# Patient Record
Sex: FEMALE | Race: WHITE | NOT HISPANIC OR LATINO | Employment: OTHER | URBAN - METROPOLITAN AREA
[De-identification: names, ages, dates, MRNs, and addresses within clinical notes are randomized per-mention and may not be internally consistent; named-entity substitution may affect disease eponyms.]

---

## 2023-03-30 ENCOUNTER — OFFICE VISIT (OUTPATIENT)
Dept: INTERNAL MEDICINE | Facility: CLINIC | Age: 78
End: 2023-03-30
Payer: MEDICARE

## 2023-03-30 ENCOUNTER — TELEPHONE (OUTPATIENT)
Dept: INTERNAL MEDICINE | Facility: CLINIC | Age: 78
End: 2023-03-30
Payer: MEDICARE

## 2023-03-30 VITALS
BODY MASS INDEX: 23.85 KG/M2 | TEMPERATURE: 98.3 F | HEIGHT: 69 IN | DIASTOLIC BLOOD PRESSURE: 78 MMHG | OXYGEN SATURATION: 98 % | SYSTOLIC BLOOD PRESSURE: 116 MMHG | HEART RATE: 70 BPM | WEIGHT: 161 LBS

## 2023-03-30 DIAGNOSIS — E78.5 HYPERLIPIDEMIA, UNSPECIFIED HYPERLIPIDEMIA TYPE: ICD-10-CM

## 2023-03-30 DIAGNOSIS — M25.569 KNEE PAIN, UNSPECIFIED CHRONICITY, UNSPECIFIED LATERALITY: ICD-10-CM

## 2023-03-30 DIAGNOSIS — Z12.31 ENCOUNTER FOR SCREENING MAMMOGRAM FOR MALIGNANT NEOPLASM OF BREAST: ICD-10-CM

## 2023-03-30 DIAGNOSIS — G25.81 RESTLESS LEG SYNDROME: ICD-10-CM

## 2023-03-30 DIAGNOSIS — R73.03 PREDIABETES: ICD-10-CM

## 2023-03-30 DIAGNOSIS — Z78.0 POST-MENOPAUSE: ICD-10-CM

## 2023-03-30 DIAGNOSIS — Z11.59 ENCOUNTER FOR HEPATITIS C SCREENING TEST FOR LOW RISK PATIENT: ICD-10-CM

## 2023-03-30 DIAGNOSIS — G47.33 OBSTRUCTIVE SLEEP APNEA SYNDROME: ICD-10-CM

## 2023-03-30 DIAGNOSIS — Z79.899 HIGH RISK MEDICATION USE: ICD-10-CM

## 2023-03-30 DIAGNOSIS — E03.9 HYPOTHYROIDISM, UNSPECIFIED TYPE: ICD-10-CM

## 2023-03-30 DIAGNOSIS — I10 PRIMARY HYPERTENSION: Primary | ICD-10-CM

## 2023-03-30 PROBLEM — E07.9 THYROID DISORDER: Status: ACTIVE | Noted: 2023-03-30

## 2023-03-30 PROBLEM — K21.9 GERD (GASTROESOPHAGEAL REFLUX DISEASE): Status: ACTIVE | Noted: 2023-03-30

## 2023-03-30 PROBLEM — M17.11 RIGHT KNEE DJD: Status: ACTIVE | Noted: 2023-03-30

## 2023-03-30 RX ORDER — LOSARTAN POTASSIUM 100 MG/1
100 TABLET ORAL DAILY
Qty: 30 TABLET | Refills: 11 | COMMUNITY
Start: 2022-04-26 | End: 2023-04-26

## 2023-03-30 RX ORDER — UREA 10 %
LOTION (ML) TOPICAL
COMMUNITY

## 2023-03-30 RX ORDER — GLUCOSAMINE HCL 500 MG
TABLET ORAL
COMMUNITY

## 2023-03-30 RX ORDER — SERTRALINE HYDROCHLORIDE 100 MG/1
100 TABLET, FILM COATED ORAL DAILY
COMMUNITY
Start: 2023-01-03

## 2023-03-30 RX ORDER — LEVOTHYROXINE SODIUM 112 UG/1
112 TABLET ORAL DAILY
Qty: 30 TABLET | Refills: 11 | COMMUNITY
Start: 2023-01-03 | End: 2024-01-03

## 2023-03-30 RX ORDER — HYDROCHLOROTHIAZIDE 12.5 MG/1
TABLET ORAL
COMMUNITY
Start: 2023-03-06 | End: 2023-03-30 | Stop reason: SDUPTHER

## 2023-03-30 RX ORDER — HYDROCHLOROTHIAZIDE 12.5 MG/1
12.5 TABLET ORAL DAILY
Qty: 90 TABLET | Refills: 2 | Status: SHIPPED | OUTPATIENT
Start: 2023-03-30 | End: 2023-04-03 | Stop reason: SDUPTHER

## 2023-03-30 RX ORDER — ATORVASTATIN CALCIUM 20 MG/1
1 TABLET, FILM COATED ORAL
COMMUNITY
Start: 2023-01-03 | End: 2023-03-30

## 2023-03-30 RX ORDER — ROPINIROLE 0.25 MG/1
0.25 TABLET, FILM COATED ORAL NIGHTLY
Qty: 90 TABLET | Refills: 2 | Status: SHIPPED | OUTPATIENT
Start: 2023-03-30

## 2023-03-30 RX ORDER — ROSUVASTATIN CALCIUM 20 MG/1
20 TABLET, COATED ORAL DAILY
Qty: 90 TABLET | Refills: 2 | Status: SHIPPED | OUTPATIENT
Start: 2023-03-30

## 2023-03-30 NOTE — PROGRESS NOTES
Stefan Valencia M.D.  Internal Medicine  [facility]  [address], [address]  [place] [address]  [phone]      Chief Complaint  Establish Care    SUBJECTIVE    History of Present Illness    Sheela Soto is a 78 y.o. female who presents to the office today as a new patient to establish care.     Today she reports RLS which keeps her up at night. She is interested in Pramipexole since her sister is on it.     A month ago she had swelling in her right leg and was started on HCTZ for edema. HCTZ did help this issue. She is wearing compression stockings.     Hypertension-on HCTZ and losartan.     Hypothyroidism-on levothyroxine    Depression-Zoloft.     Hyperlipidemia-intolerant to statins    Takes OTC omeprazole for GERD    OMAYRA-follows with Dr. Gonzales at [facility].    Social-She has one daughter. Her son was killed in a MVA. Walks a half mile/day.     Review of Systems    Allergies   Allergen Reactions   • Codeine Nausea Only   • Latex Rash     Doesn't like to wear latex gloves          Outpatient Medications Marked as Taking for the 3/30/23 encounter (Office Visit) with Stefan Valencia MD   Medication Sig Dispense Refill   • Carboxymeth-Glycerin-Polysorb 0.5-1-0.5 % solution Apply  to eye(s) as directed by provider.     • Cholecalciferol (Vitamin D3) 75 MCG (3000 UT) tablet Take  by mouth.     • estrogens, conjugated, (PREMARIN) 0.625 MG tablet Take 1 tablet by mouth Daily.     • hydroCHLOROthiazide (HYDRODIURIL) 12.5 MG tablet Take 1 tablet by mouth Daily. 90 tablet 2   • levothyroxine (SYNTHROID, LEVOTHROID) 112 MCG tablet Take 1 tablet by mouth Daily. 30 tablet 11   • losartan (COZAAR) 100 MG tablet Take 1 tablet by mouth Daily. 30 tablet 11   • Omeprazole 20 MG Tablet Delayed Release Dispersible   Oral, Daily, 0 Refill(s)     • sertraline (ZOLOFT) 100 MG tablet Take 1 tablet by mouth Daily.     • Zinc Sulfate 220 (50 Zn) MG tablet Take  by mouth.     • [DISCONTINUED] atorvastatin  "(LIPITOR) 20 MG tablet Take 1 tablet by mouth every night at bedtime.     • [DISCONTINUED] hydroCHLOROthiazide (HYDRODIURIL) 12.5 MG tablet           Past Medical History:   Diagnosis Date   • Hyperlipidemia    • Hypertension    • Hypothyroidism    • Sleep apnea      Past Surgical History:   Procedure Laterality Date   • HYSTERECTOMY     • REPLACEMENT TOTAL KNEE BILATERAL       Family History   Problem Relation Age of Onset   • Pneumonia Mother    • No Known Problems Brother    • Autism Other     reports that she has never smoked. She has never used smokeless tobacco. She reports current alcohol use of about 3.0 standard drinks per week.    OBJECTIVE    Vital Signs:   /78   Pulse 70   Temp 98.3 °F (36.8 °C)   Ht 174 cm (68.5\")   Wt 73 kg (161 lb)   SpO2 98%   BMI 24.12 kg/m²     Physical Exam  Constitutional:       Appearance: Normal appearance. She is normal weight.   Cardiovascular:      Rate and Rhythm: Normal rate and regular rhythm.      Heart sounds: Normal heart sounds. No murmur heard.  Pulmonary:      Effort: Pulmonary effort is normal.      Breath sounds: Normal breath sounds.   Abdominal:      General: Abdomen is flat. There is no distension.      Palpations: Abdomen is soft.      Tenderness: There is no abdominal tenderness.   Skin:     General: Skin is warm and dry.   Neurological:      Mental Status: She is alert.   Psychiatric:         Mood and Affect: Mood normal.         Behavior: Behavior normal.         Thought Content: Thought content normal.            The following data was reviewed by: Stefan Valencia MD on 03/30/2023:  Common labs    Common Labs 5/4/22 5/4/22 5/4/22 8/4/22 3/30/23 3/30/23 3/30/23 3/30/23    1439 1439 1439  1451 1451 1451 1451   Glucose       95    Glucose   87        BUN   14    13    Creatinine   0.58    0.66    Sodium   139    136    Potassium   4.1    4.5    Chloride   102    98    Calcium   9.3    9.8    Total Protein       6.7    Albumin   4.3    4.3    Total " Bilirubin   0.5    0.5    Alkaline Phosphatase   78    84    AST (SGOT)   30    18    ALT (SGPT)   15    11    WBC 5.23     5.6     Hemoglobin 12.4     13.3     Hematocrit 38.1     39.0     Platelets 264     308     Total Cholesterol     271 (A)      Triglycerides     125      HDL Cholesterol     93      LDL Cholesterol      157 (A)      Hemoglobin A1C  6.2 (A)  6.2 (A)    6.2 (A)   (A) Abnormal value       Comments are available for some flowsheets but are not being displayed.           Data reviewed: Pulmonology note.             ASSESSMENT & PLAN     Diagnoses and all orders for this visit:    1. Primary hypertension (Primary)  Assessment & Plan:  Hypertension is unchanged.  Continue current treatment regimen.  Blood pressure will be reassessed at the next regular appointment.    Orders:  -     hydroCHLOROthiazide (HYDRODIURIL) 12.5 MG tablet; Take 1 tablet by mouth Daily.  Dispense: 90 tablet; Refill: 2    2. Post-menopause  -     DEXA Bone Density Axial    3. Encounter for screening mammogram for malignant neoplasm of breast  -     Mammo Screening Digital Tomosynthesis Bilateral With CAD; Future    4. Encounter for hepatitis C screening test for low risk patient  -     HCV Antibody Rfx To Qnt PCR    5. Prediabetes  -     Hemoglobin A1c    6. High risk medication use  -     Comprehensive Metabolic Panel    7. Obstructive sleep apnea syndrome  -     Ambulatory Referral to Sleep Medicine    8. Hyperlipidemia, unspecified hyperlipidemia type  -     Lipid Panel With LDL / HDL Ratio  -     rosuvastatin (Crestor) 20 MG tablet; Take 1 tablet by mouth Daily.  Dispense: 90 tablet; Refill: 2    9. Restless leg syndrome  Assessment & Plan:  Discussed etiology of restless leg syndrome and that my iron may help if she is deficient.  We will try pramipexole in the meantime since this is interfering significantly with her life.    Orders:  -     CBC & Differential  -     Iron and TIBC  -     Ferritin  -     rOPINIRole (REQUIP)  0.25 MG tablet; Take 1 tablet by mouth Every Night. Take 1 hour before bedtime.  Dispense: 90 tablet; Refill: 2    10. Hypothyroidism, unspecified type  -     TSH Rfx On Abnormal To Free T4    11. Knee pain, unspecified chronicity, unspecified laterality  -     Iron and TIBC  -     Ferritin    Other orders  -     Interpretation:    I spent 66  minutes caring for Sheela on this date of service. This time includes time spent by me in the following activities:preparing for the visit, reviewing tests, performing a medically appropriate examination and/or evaluation , counseling and educating the patient/family/caregiver, ordering medications, tests, or procedures and documenting information in the medical record      Health Maintenance Due   Topic Date Due   • DXA SCAN  Never done   • TDAP/TD VACCINES (1 - Tdap) Never done   • ZOSTER VACCINE (1 of 2) Never done   • COVID-19 Vaccine (5 - Booster for Pfizer series) 09/29/2022        Follow Up  Return in about 6 months (around 9/30/2023).    Patient/family had no further questions at this time and verbalized understanding of the plan discussed today.

## 2023-03-30 NOTE — TELEPHONE ENCOUNTER
Hub staff attempted to follow warm transfer process and was unsuccessful     Caller: Sheela Soto A    Relationship to patient: Self    Best call back number: [phone]    Patient is needing: PATIENT WANTED TO LET THE OFFICE KNOW SHE UPDATED HER PCP WITH HER INSURANCE. SHE HAS A REFERENCE NUMBER IF THE OFFICE STAFF NEEDS THAT.

## 2023-03-31 PROBLEM — G25.81 RESTLESS LEG SYNDROME: Status: ACTIVE | Noted: 2023-03-31

## 2023-03-31 LAB
ALBUMIN SERPL-MCNC: 4.3 G/DL (ref 3.7–4.7)
ALBUMIN/GLOB SERPL: 1.8 {RATIO} (ref 1.2–2.2)
ALP SERPL-CCNC: 84 IU/L (ref 44–121)
ALT SERPL-CCNC: 11 IU/L (ref 0–32)
AST SERPL-CCNC: 18 IU/L (ref 0–40)
BASOPHILS # BLD AUTO: 0 X10E3/UL (ref 0–0.2)
BASOPHILS NFR BLD AUTO: 1 %
BILIRUB SERPL-MCNC: 0.5 MG/DL (ref 0–1.2)
BUN SERPL-MCNC: 13 MG/DL (ref 8–27)
BUN/CREAT SERPL: 20 (ref 12–28)
CALCIUM SERPL-MCNC: 9.8 MG/DL (ref 8.7–10.3)
CHLORIDE SERPL-SCNC: 98 MMOL/L (ref 96–106)
CHOLEST SERPL-MCNC: 271 MG/DL (ref 100–199)
CO2 SERPL-SCNC: 23 MMOL/L (ref 20–29)
CREAT SERPL-MCNC: 0.66 MG/DL (ref 0.57–1)
EGFRCR SERPLBLD CKD-EPI 2021: 89 ML/MIN/1.73
EOSINOPHIL # BLD AUTO: 0.1 X10E3/UL (ref 0–0.4)
EOSINOPHIL NFR BLD AUTO: 2 %
ERYTHROCYTE [DISTWIDTH] IN BLOOD BY AUTOMATED COUNT: 13.5 % (ref 11.7–15.4)
FERRITIN SERPL-MCNC: 18 NG/ML (ref 15–150)
GLOBULIN SER CALC-MCNC: 2.4 G/DL (ref 1.5–4.5)
GLUCOSE SERPL-MCNC: 95 MG/DL (ref 70–99)
HBA1C MFR BLD: 6.2 % (ref 4.8–5.6)
HCT VFR BLD AUTO: 39 % (ref 34–46.6)
HCV AB SERPL QL IA: NORMAL
HCV IGG SERPL QL IA: NON REACTIVE
HDLC SERPL-MCNC: 93 MG/DL
HGB BLD-MCNC: 13.3 G/DL (ref 11.1–15.9)
IMM GRANULOCYTES # BLD AUTO: 0 X10E3/UL (ref 0–0.1)
IMM GRANULOCYTES NFR BLD AUTO: 0 %
IRON SATN MFR SERPL: 19 % (ref 15–55)
IRON SERPL-MCNC: 89 UG/DL (ref 27–139)
LDLC SERPL CALC-MCNC: 157 MG/DL (ref 0–99)
LDLC/HDLC SERPL: 1.7 RATIO (ref 0–3.2)
LYMPHOCYTES # BLD AUTO: 2.1 X10E3/UL (ref 0.7–3.1)
LYMPHOCYTES NFR BLD AUTO: 37 %
MCH RBC QN AUTO: 29.4 PG (ref 26.6–33)
MCHC RBC AUTO-ENTMCNC: 34.1 G/DL (ref 31.5–35.7)
MCV RBC AUTO: 86 FL (ref 79–97)
MONOCYTES # BLD AUTO: 0.4 X10E3/UL (ref 0.1–0.9)
MONOCYTES NFR BLD AUTO: 6 %
NEUTROPHILS # BLD AUTO: 3.1 X10E3/UL (ref 1.4–7)
NEUTROPHILS NFR BLD AUTO: 54 %
PLATELET # BLD AUTO: 308 X10E3/UL (ref 150–450)
POTASSIUM SERPL-SCNC: 4.5 MMOL/L (ref 3.5–5.2)
PROT SERPL-MCNC: 6.7 G/DL (ref 6–8.5)
RBC # BLD AUTO: 4.53 X10E6/UL (ref 3.77–5.28)
SODIUM SERPL-SCNC: 136 MMOL/L (ref 134–144)
TIBC SERPL-MCNC: 467 UG/DL (ref 250–450)
TRIGL SERPL-MCNC: 125 MG/DL (ref 0–149)
TSH SERPL DL<=0.005 MIU/L-ACNC: 1.24 UIU/ML (ref 0.45–4.5)
UIBC SERPL-MCNC: 378 UG/DL (ref 118–369)
VLDLC SERPL CALC-MCNC: 21 MG/DL (ref 5–40)
WBC # BLD AUTO: 5.6 X10E3/UL (ref 3.4–10.8)

## 2023-03-31 RX ORDER — DOXYCYCLINE HYCLATE 50 MG/1
324 CAPSULE, GELATIN COATED ORAL EVERY OTHER DAY
Qty: 45 TABLET | Refills: 2 | Status: SHIPPED | OUTPATIENT
Start: 2023-03-31

## 2023-03-31 NOTE — ASSESSMENT & PLAN NOTE
Discussed etiology of restless leg syndrome and that my iron may help if she is deficient.  We will try pramipexole in the meantime since this is interfering significantly with her life.

## 2023-04-03 DIAGNOSIS — I10 PRIMARY HYPERTENSION: ICD-10-CM

## 2023-04-03 RX ORDER — HYDROCHLOROTHIAZIDE 12.5 MG/1
12.5 TABLET ORAL DAILY
Qty: 90 TABLET | Refills: 2 | Status: SHIPPED | OUTPATIENT
Start: 2023-04-03

## 2023-04-03 NOTE — TELEPHONE ENCOUNTER
Caller: Sheela Soto    Relationship: Self    Best call back number: [phone]    Requested Prescriptions:   Requested Prescriptions     Pending Prescriptions Disp Refills   • hydroCHLOROthiazide (HYDRODIURIL) 12.5 MG tablet 90 tablet 2     Sig: Take 1 tablet by mouth Daily.        Pharmacy where request should be sent: [facility] [id number] [facility] [address] [facility] [phone] [facility] [phone] FX     Last office visit with prescribing clinician: 3/30/2023   Last telemedicine visit with prescribing clinician: Visit date not found   Next office visit with prescribing clinician: 10/4/2023     Does the patient have less than a 3 day supply:  [x] Yes  [] No    Would you like a call back once the refill request has been completed: [x] Yes [] No    If the office needs to give you a call back, can they leave a voicemail: [x] Yes [] No    Aarti Ordaz   04/03/23 11:12 EDT

## 2023-05-10 ENCOUNTER — TELEPHONE (OUTPATIENT)
Dept: INTERNAL MEDICINE | Facility: CLINIC | Age: 78
End: 2023-05-10

## 2023-05-10 NOTE — TELEPHONE ENCOUNTER
Caller: Sheela Soto    Relationship to patient: Self    Best call back number: [phone]    New or established patient?  [] New  [x] Established    Date of discharge: 5/7/23    Facility discharged from: [facility]    Diagnosis/Symptoms: FALL AND KNOT ON HEAD    SCHEDULED FOR 5/11/23

## 2023-05-11 ENCOUNTER — OFFICE VISIT (OUTPATIENT)
Dept: INTERNAL MEDICINE | Facility: CLINIC | Age: 78
End: 2023-05-11
Payer: MEDICARE

## 2023-05-11 VITALS
WEIGHT: 157 LBS | DIASTOLIC BLOOD PRESSURE: 80 MMHG | BODY MASS INDEX: 23.25 KG/M2 | OXYGEN SATURATION: 96 % | TEMPERATURE: 97.4 F | HEIGHT: 69 IN | SYSTOLIC BLOOD PRESSURE: 130 MMHG

## 2023-05-11 DIAGNOSIS — I10 PRIMARY HYPERTENSION: ICD-10-CM
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
## 2023-05-11 NOTE — PROGRESS NOTES
Stefan Valencia M.D.  Internal Medicine  [facility]  [address], [address]  [place] [address]  [phone]      Chief Complaint  Hospital Follow Up Visit ([facility] for fall knot on head. Records in Care Everywhere )    SUBJECTIVE    History of Present Illness    Sheela Soto is a 78 y.o. female with history of RLS, hypertension, OMAYRA, hypothyroidism, depression, hyperlipidemia who presents to the office today as an established patient that last saw me on 3/30/2023.     She is here for emergency department follow-up after a fall. She caught her right shoe on the wood floor and its in her falling and hit the back of her head on a coffee table. Apparently she had tripped in these shoes before. She had a scalp hematoma. Feels like she does not pick her feet up enough. She has fallen 4-5 times in past 5 years.  She has removed rugs from her home and has hardwood floors.      She denies lightheadedness/dizziness.     She did not take her blood pressure medicine this AM. She is not checking her BP at home.    She was started on HCTZ in February. She is happy with this helping her LE edema.     Her friend recently passed away.     Review of Systems    Allergies   Allergen Reactions   • Codeine Nausea Only   • Latex Rash     Doesn't like to wear latex gloves          Outpatient Medications Marked as Taking for the 5/11/23 encounter (Office Visit) with Stefan Valencia MD   Medication Sig Dispense Refill   • Carboxymeth-Glycerin-Polysorb 0.5-1-0.5 % solution Apply  to eye(s) as directed by provider.     • Cholecalciferol (Vitamin D3) 75 MCG (3000 UT) tablet Take  by mouth.     • estrogens, conjugated, (PREMARIN) 0.625 MG tablet Take 1 tablet by mouth Daily.     • ferrous gluconate (FERGON) 324 MG tablet Take 1 tablet by mouth Every Other Day. 45 tablet 2   • hydroCHLOROthiazide (HYDRODIURIL) 12.5 MG tablet Take 1 tablet by mouth Daily. 90 tablet 2   • levothyroxine (SYNTHROID, LEVOTHROID) 112  "MCG tablet Take 1 tablet by mouth Daily. 30 tablet 11   • Omeprazole 20 MG Tablet Delayed Release Dispersible   Oral, Daily, 0 Refill(s)     • rOPINIRole (REQUIP) 0.25 MG tablet Take 1 tablet by mouth Every Night. Take 1 hour before bedtime. 90 tablet 2   • rosuvastatin (Crestor) 20 MG tablet Take 1 tablet by mouth Daily. 90 tablet 2   • sertraline (ZOLOFT) 100 MG tablet Take 1 tablet by mouth Daily.     • Zinc Sulfate 220 (50 Zn) MG tablet Take  by mouth.          Past Medical History:   Diagnosis Date   • Hyperlipidemia    • Hypertension    • Hypothyroidism    • Sleep apnea      Past Surgical History:   Procedure Laterality Date   • HYSTERECTOMY     • REPLACEMENT TOTAL KNEE BILATERAL       Family History   Problem Relation Age of Onset   • Pneumonia Mother    • No Known Problems Brother    • Autism Other     reports that she has never smoked. She has never used smokeless tobacco. She reports current alcohol use of about 3.0 standard drinks per week.    OBJECTIVE    Vital Signs:   /80   Temp 97.4 °F (36.3 °C)   Ht 174 cm (68.5\")   Wt 71.2 kg (157 lb)   SpO2 96%   BMI 23.52 kg/m²     Physical Exam  Constitutional:       Appearance: Normal appearance. She is normal weight.   Cardiovascular:      Rate and Rhythm: Normal rate and regular rhythm.      Heart sounds: Normal heart sounds. No murmur heard.  Pulmonary:      Effort: Pulmonary effort is normal.      Breath sounds: Normal breath sounds.   Skin:     General: Skin is warm and dry.      Comments: Left scalp hematoma   Neurological:      Mental Status: She is alert.   Psychiatric:         Mood and Affect: Mood normal.         Behavior: Behavior normal.         Thought Content: Thought content normal.            Vitals:    05/11/23 0808 05/11/23 0817 05/11/23 0818   Orthostatic BP: 167/76 161/77 137/74   Orthostatic Pulse: 74 74 70   Patient Position: Sitting Lying Standing         The following data was reviewed by: Stefan Valencia MD on 05/11/2023:  Common " labs        8/4/2022    14:54 3/30/2023    14:51   Common Labs   Glucose  95     BUN  13     Creatinine  0.66     Sodium  136     Potassium  4.5     Chloride  98     Calcium  9.8     Total Protein  6.7     Albumin  4.3     Total Bilirubin  0.5     Alkaline Phosphatase  84     AST (SGOT)  18     ALT (SGPT)  11     WBC 6.35      5.6     Hemoglobin 12.2      13.3     Hematocrit 38.4      39.0     Platelets 254      308     Total Cholesterol  271     Triglycerides  125     HDL Cholesterol  93     LDL Cholesterol   157     Hemoglobin A1C 6.2      6.2         This result is from an external source.     Data reviewed: ED notes from [facility]         DATE: 01/14/2023 2:11 PM    EXAMINATION: CR Chest 2 Vws    PROVIDED INDICATION: ro pneumonia    COMPARISON: None available    FINDINGS:    PA and lateral views of the chest were obtained.    The lungs are clear. The cardiomediastinal silhouette is normal in size. There is a medium to large esophageal hiatal hernia. No pneumothorax or pleural effusion is present. Multilevel degenerative disc disease of the spine is evident.      IMPRESSION:    1. No acute abnormality.    2. Medium sized to large esophageal hiatal hernia.  CT Head WO (05/07/2023 17:56)  Result: ACCESSION NUMBER: [id number]: 5/7/2023 5:48 PMEXAMINATION: CT Head WOPROVIDED INDICATION: Head Injury: headache, head injuryCOMPARISON: NoneTECHNIQUE: Axial computed tomographic images of the brain from the base of the skull to the cranial apex without intravenous contrast administration. Reformatted images include axial 1 mm and axial 5 mm.FINDINGS:Major Findings: There is no intracranial hemorrhage, mass, midline shift, or evidence of acute/subacute infarct. Small left posterior scalp hematoma.Incidental and Normal Findings: Deep gray, Cortex and White Matter: Gray matter-white matter differentiation is within normal limits.Sulci and Ventricles: The ventricles and sulci are mildly prominent.Soft Tissues, Cranium and  Orbits: Orbits and globes are normal in appearance. The bony calvarium is intact. Paranasal Sinuses and Mastoid Air Cells: Paranasal sinuses are clear. Mastoid air cells are clear.Vessels: There are no vascular calcifications.IMPRESSION: No acute intracranial abnormality. Small left posterior scalp hematoma.Dictated by: Vitaly Fountain M.D. Signed by Vitaly Fountain M.D. on 5/7/2023 6:08 PM      ASSESSMENT & PLAN     Diagnoses and all orders for this visit:    1. Fall, subsequent encounter (Primary)  -     Ambulatory Referral to Physical Therapy    2. Primary hypertension      Discussed fall prevention in the home. Patient orthostatic today but without symptoms. Continue current antihypertensives and consider discontinuing HCTZ in future if continued falls or new orthostatic symptoms. This recent fall sounds purely mechanical. Will refer to PT to work on balance.      Health Maintenance Due   Topic Date Due   • DXA SCAN  Never done   • TDAP/TD VACCINES (1 - Tdap) Never done   • ZOSTER VACCINE (1 of 2) Never done   • COVID-19 Vaccine (5 - Booster for Pfizer series) 09/29/2022   • ANNUAL WELLNESS VISIT  05/04/2023        Follow Up  No follow-ups on file.    Patient/family had no further questions at this time and verbalized understanding of the plan discussed today.

## 2023-06-15 ENCOUNTER — HOSPITAL ENCOUNTER (OUTPATIENT)
Dept: PHYSICAL THERAPY | Facility: HOSPITAL | Age: 78
Setting detail: THERAPIES SERIES
Discharge: HOME | End: 2023-06-15
Payer: MEDICARE
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
## 2023-06-15 DIAGNOSIS — R26.89 IMPAIRMENT OF BALANCE: ICD-10-CM

## 2023-06-15 PROCEDURE — 97161 PT EVAL LOW COMPLEX 20 MIN: CPT

## 2023-06-15 PROCEDURE — 97110 THERAPEUTIC EXERCISES: CPT

## 2023-06-15 NOTE — THERAPY EVALUATION
Outpatient Physical Therapy Ortho Initial Evaluation  [facility]     Patient Name: Sheela Soto  : 1945  MRN: [id number]  Today's Date: 6/15/2023      Visit Date: 06/15/2023    Patient Active Problem List   Diagnosis   • Colon polyps   • Depression   • GERD (gastroesophageal reflux disease)   • Hyperlipidemia   • Hypertension   • Right knee DJD   • Thyroid disorder   • Restless leg syndrome        Past Medical History:   Diagnosis Date   • Hyperlipidemia    • Hypertension    • Hypothyroidism    • Sleep apnea         Past Surgical History:   Procedure Laterality Date   • HYSTERECTOMY     • REPLACEMENT TOTAL KNEE BILATERAL         Visit Dx:     ICD-10-CM ICD-9-CM   1. Fall, subsequent encounter  W19.XXXD V58.89     E888.9   2. Impairment of balance  R26.89 781.2          Patient History     Row Name 06/15/23 0800             History    Brief Description of Current Complaint Pt reports 5x over the past 3-4 years. Most recent fall, went to ER. Pt was wearing tennis shoes and caught toe, fell forward against lamp table and hit back of head. CAT scan, no concussion, brain bleeding, etc. MD referred to PT for balance impairment. Pt with high bed, requiring stool to get in bed. One fall, fell off of stool and fell backward. No AD, does not feel that balance is impaired, however unable to stop if beginning to fall. [place], however not much in yard. Grandson lives with her, stairs in home able to do while holding onto HR. No longer takes tub baths due to fear of getting back up. Pt with history of B knee TKA 10 years ago.  -CN      Patient/Caregiver Goals Know what to do to help the symptoms;Improve strength;Improve mobility  -CN         Pain     What Performance Factors Make the Current Problem(s) WORSE? cautious with walking on grass, sidewalks  -CN      Difficulties with ADL's? Able to perform ADLs, HR with stairs  -CN      Difficulties with recreational activities? No longer walks in yard   -CN         Fall Risk Assessment    Any falls in the past year: Yes  -CN      Number of falls reported in the last 12 months 1  -CN         Services    Are you currently receiving Home Health services No  -CN         Daily Activities    Primary Language English  -CN      Are you able to read Yes  -CN      Are you able to write Yes  -CN      Barriers to learning None  -CN      Pt Participated in POC and Goals Yes  -CN         Safety    Are you being hurt, hit, or frightened by anyone at home or in your life? No  -CN      Are you being neglected by a caregiver No  -CN            User Key  (r) = Recorded By, (t) = Taken By, (c) = Cosigned By    Initials Name Provider Type    Mabel Abdullahi, PT Physical Therapist                 PT Ortho     Row Name 06/15/23 0800       Posture/Observations    Alignment Options Forward head;Rounded shoulders;Iliac crests  -CN    Forward Head Moderate  -CN    Rounded Shoulders Moderate  -CN    Iliac crests Left:;Elevated  -CN       Myotomal Screen- Lower Quarter Clearing    Hip flexion (L2) 4+ (Good +)  -CN    Knee extension (L3) 4+ (Good +)  -CN    Ankle DF (L4) 5 (Normal)  -CN    Ankle PF (S1) 5 (Normal)  -CN    Knee flexion (S2) 4+ (Good +)  -CN       MMT (Manual Muscle Testing)    Rt Lower Ext Rt Hip ABduction  -CN    Lt Lower Ext Lt Hip ABduction  -CN       MMT Right Lower Ext    Rt Hip ABduction MMT, Gross Movement (3+/5) fair plus  -CN       MMT Left Lower Ext    Lt Hip ABduction MMT, Gross Movement (4-/5) good minus  -CN       Lower Extremity Flexibility    Hamstrings Bilateral:;Mildly limited  -CN    Hip Flexors Bilateral:;Moderately limited  -CN       Balance Skills Training    SLS R=3 sec, L=17 sec following several attampts  -CN       Gait/Stairs (Locomotion)    Comment, (Gait/Stairs) No AD, dec hip ext  -CN          User Key  (r) = Recorded By, (t) = Taken By, (c) = Cosigned By    Initials Name Provider Type    Mabel Abdullahi, PT Physical Therapist                             Therapy Education  Education Details: Access Code [email or web address], anatomy, goals of PT, eval findings, safety at home  Given: HEP, Symptoms/condition management, Pain management  Program: New  How Provided: Verbal, Demonstration, Written  Provided to: Patient  Level of Understanding: Teach back education performed, Verbalized, Demonstrated      PT OP Goals     Row Name 06/15/23 0900          PT Short Term Goals    STG Date to Achieve 07/15/23  -CN     STG 1 Pt will be independent with initial HEP for symptom management.  -CN     STG 1 Progress New  -CN     STG 2 Pt reports no falls since initiating therapy.  -CN     STG 2 Progress New  -CN        Long Term Goals    LTG Date to Achieve 08/15/23  -CN     LTG 1 Pt will be independent and compliant with advanced HEP for long term management of symptoms and prevention of future occurrence.  -CN     LTG 1 Progress New  -CN     LTG 2 Pt will demonstrate B hip strength to 4/5 or better in order to improve balance reactions.  -CN     LTG 2 Progress New  -CN     LTG 3 Pt will be able to achieve 10 STS during 30 sec without UEs in order to demontrate improved endurance with ADLs.  -CN     LTG 3 Progress New  -CN     LTG 4 Pt will be able to perform floor transfer in order to increase safety at home.  -CN     LTG 4 Progress New  -CN        Time Calculation    PT Goal Re-Cert Due Date 07/15/23  -CN           User Key  (r) = Recorded By, (t) = Taken By, (c) = Cosigned By    Initials Name Provider Type    Mabel Abdullahi, PT Physical Therapist                 PT Assessment/Plan     Row Name 06/15/23 0958          PT Assessment    Functional Limitations Decreased safety during functional activities;Impaired gait;Impaired locomotion;Limitations in community activities;Performance in leisure activities  -CN     Impairments Balance;Endurance;Gait;Impaired flexibility;Muscle strength;Posture  -CN     Assessment Comments 78 y.o. female referred to outpatient  physical therapy for evaluation and treatment of history of falls/balance impairment.  Patient presents with dec B hip strength, dec hip flexibility, impaired endurance, poor posture, impaired balance reactions. Pt's signs and symptoms are consistent with referring diagnosis.  Pertinent comorbidities and personal factors that may affect progress include, but are not limited to, history of falls, posture, history B TKA.  Pt able to perform 6 STS during 30 sec STS test and is in stable clinical condition. Pt would benefit from skilled PT to address functional deficits and return to PLOF.  -CN     Please refer to paper survey for additional self-reported information Yes  -CN     Rehab Potential Good  -CN     Patient/caregiver participated in establishment of treatment plan and goals Yes  -CN     Patient would benefit from skilled therapy intervention Yes  -CN        PT Plan    PT Frequency 1x/week  -CN     Predicted Duration of Therapy Intervention (PT) 8-12 visits  -CN     Planned CPT's? PT EVAL LOW COMPLEXITY: 13428;PT RE-EVAL: 46126;PT THER ACT EA 15 MIN: 61444;PT THER PROC EA 15 MIN: 84999;PT MANUAL THERAPY EA 15 MIN: 62965;PT NEUROMUSC RE-EDUCATION EA 15 MIN: 48435;PT GAIT TRAINING EA 15 MIN: 62436;PT SELF CARE/HOME MGMT/TRAIN EA 15: 46126;PT HOT OR COLD PACK TREAT MCARE  -CN     PT Plan Comments Assess response to evaluation and consider Nustep warm up, tandem gait in // bars, lateral step/reach, obstacle course, stagger stance trunk rotation. Likely transition to circuit training for increase endurance including balance and hip strengthening tasks. Pt would benefit from work on balance reactions, hip strengthening and likely work on floor transfers as strength improves.  -CN           User Key  (r) = Recorded By, (t) = Taken By, (c) = Cosigned By    Initials Name Provider Type    Mabel Abdullahi, PT Physical Therapist                   OP Exercises     Row Name 06/15/23 0900             Total Minutes     09202 - PT Therapeutic Exercise Minutes 10  -CN         Exercise 1    Exercise Name 1 STS with RTB  -CN      Cueing 1 Verbal;Demo  -CN      Reps 1 10  -CN      Additional Comments from std chair, hands outstretched, cues to dec valgus  -CN         Exercise 2    Exercise Name 2 HR  -CN      Cueing 2 Verbal;Demo  -CN      Reps 2 20  -CN         Exercise 3    Exercise Name 3 SLS  -CN      Cueing 3 Verbal;Demo  -CN      Reps 3 20 sec  -CN         Exercise 4    Exercise Name 4 Resisted sidestepping, RTB  -CN      Cueing 4 Verbal;Demo  -CN      Reps 4 3 laps  -CN      Time 4 RTB  -CN            User Key  (r) = Recorded By, (t) = Taken By, (c) = Cosigned By    Initials Name Provider Type    Mabel Abdullahi, PT Physical Therapist                              Outcome Measure Options: Lower Extremity Functional Scale (LEFS), 30 Second Chair Stand Test  30 Second Chair Stand Test  30 Second Chair Stand Test: 6 (no UEs)  Lower Extremity Functional Index  Any of your usual work, housework or school activities: A little bit of difficulty  Your usual hobbies, recreational or sporting activities: A little bit of difficulty  Getting into or out of the bath: Extreme difficulty or unable to perform activity  Walking between rooms: No difficulty  Putting on your shoes or socks: No difficulty  Squatting: Extreme difficulty or unable to perform activity  Lifting an object, like a bag of groceries from the floor: No difficulty  Performing light activities around your home: No difficulty  Performing heavy activities around your home: Quite a bit of difficulty  Getting into or out of a car: A little bit of difficulty  Walking 2 blocks: No difficulty  Walking a mile: A little bit of difficulty  Going up or down 10 stairs (about 1 flight of stairs): No difficulty  Standing for 1 hour: No difficulty  Sitting for 1 hour: No difficulty  Running on even ground: Extreme difficulty or unable to perform activity  Running on uneven  ground: Extreme difficulty or unable to perform activity  Making sharp turns while running fast: Extreme difficulty or unable to perform activity  Hopping: Extreme difficulty or unable to perform activity  Rolling over in bed: No difficulty  Total: 49      Time Calculation:     Start Time: 0830  Stop Time: 0909  Time Calculation (min): 39 min  Timed Charges  03980 - PT Therapeutic Exercise Minutes: 10  Total Minutes  Timed Charges Total Minutes: 10   Total Minutes: 10     Therapy Charges for Today     Code Description Service Date Service Provider Modifiers Qty    16988038076 HC PT THER PROC EA 15 MIN 6/15/2023 Mabel Abdi, PT GP 1    [id number]  PT EVAL LOW COMPLEXITY 2 6/15/2023 Mabel Abdi, PT GP 1          PT G-Codes  Outcome Measure Options: Lower Extremity Functional Scale (LEFS), 30 Second Chair Stand Test  Total: 49         Mabel Abdi, PT  6/15/2023

## 2023-07-26 ENCOUNTER — HOSPITAL ENCOUNTER (OUTPATIENT)
Dept: PHYSICAL THERAPY | Facility: HOSPITAL | Age: 78
Setting detail: THERAPIES SERIES
Discharge: HOME | End: 2023-07-26
Payer: MEDICARE

## 2023-07-26 DIAGNOSIS — R26.89 IMPAIRMENT OF BALANCE: ICD-10-CM
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
## 2023-07-26 PROCEDURE — 97110 THERAPEUTIC EXERCISES: CPT

## 2023-07-26 PROCEDURE — 97530 THERAPEUTIC ACTIVITIES: CPT

## 2023-08-02 ENCOUNTER — APPOINTMENT (OUTPATIENT)
Dept: PHYSICAL THERAPY | Facility: HOSPITAL | Age: 78
End: 2023-08-02
Payer: MEDICARE

## 2023-08-09 ENCOUNTER — HOSPITAL ENCOUNTER (OUTPATIENT)
Dept: PHYSICAL THERAPY | Facility: HOSPITAL | Age: 78
Setting detail: THERAPIES SERIES
Discharge: HOME | End: 2023-08-09
Payer: MEDICARE

## 2023-08-09 DIAGNOSIS — R26.89 IMPAIRMENT OF BALANCE: ICD-10-CM
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
## 2023-08-09 PROCEDURE — 97530 THERAPEUTIC ACTIVITIES: CPT

## 2023-08-09 PROCEDURE — 97110 THERAPEUTIC EXERCISES: CPT

## 2023-08-09 NOTE — THERAPY DISCHARGE NOTE
Outpatient Physical Therapy Ortho Treatment Note/Discharge Summary  [facility]     Patient Name: Sheela Soto  : 1945  MRN: [id number]  Today's Date: 2023      Visit Date: 2023    Visit Dx:    ICD-10-CM ICD-9-CM   1. Fall, subsequent encounter  W19.XXXD V58.89     E888.9   2. Impairment of balance  R26.89 781.2       Patient Active Problem List   Diagnosis    Colon polyps    Depression    GERD (gastroesophageal reflux disease)    Hyperlipidemia    Hypertension    Right knee DJD    Thyroid disorder    Restless leg syndrome        Past Medical History:   Diagnosis Date    Hyperlipidemia     Hypertension     Hypothyroidism     Sleep apnea         Past Surgical History:   Procedure Laterality Date    HYSTERECTOMY      REPLACEMENT TOTAL KNEE BILATERAL                                  OP Exercises       Row Name 23 1500             Subjective Comments    Subjective Comments I am doing really good. I got the job so I am on call a lot. I think I am ready for this to be my last time.  -CN         Total Minutes    71740 - PT Therapeutic Exercise Minutes 15  -CN      80255 - PT Therapeutic Activity Minutes 15  -CN         Exercise 1    Exercise Name 1 STS with RTB  -CN      Cueing 1 Verbal;Demo  -CN         Exercise 3    Exercise Name 3 SLS  -CN      Cueing 3 Verbal;Demo  -CN      Sets 3 3  -CN      Reps 3 20 sec  -CN         Exercise 4    Exercise Name 4 Resisted sidestepping, RTB  -CN      Cueing 4 Verbal;Demo  -CN      Reps 4 3 laps  -CN      Time 4 GTB  -CN         Exercise 5    Exercise Name 5 Nustep, L5  -CN      Time 5 5 min  -CN         Exercise 6    Exercise Name 6 3D hip on blue foam, RTB  -CN      Cueing 6 Verbal;Demo  -CN      Reps 6 10 B  -CN         Exercise 7    Exercise Name 7 Lateral step/reach  -CN      Cueing 7 Verbal;Demo  -CN      Reps 7 10 B  -CN         Exercise 8    Exercise Name 8 Tandem gait, forward/retro  -CN      Cueing 8 Verbal;Demo  -CN      Reps 8 3 laps  -CN          Exercise 10    Exercise Name 10 Monster walk, forward/retro  -CN      Cueing 10 Verbal;Demo  -CN      Reps 10 3 laps  -CN      Time 10 cues for form at times, erect posture  -CN                User Key  (r) = Recorded By, (t) = Taken By, (c) = Cosigned By      Initials Name Provider Type    Mabel Abdullahi, PT Physical Therapist                                    PT OP Goals       Row Name 08/09/23 1500          PT Short Term Goals    STG Date to Achieve 07/15/23  -CN     STG 1 Pt will be independent with initial HEP for symptom management.  -CN     STG 1 Progress Met  -CN     STG 2 Pt reports no falls since initiating therapy.  -CN     STG 2 Progress Met  -CN        Long Term Goals    LTG Date to Achieve 08/15/23  -CN     LTG 1 Pt will be independent and compliant with advanced HEP for long term management of symptoms and prevention of future occurrence.  -CN     LTG 1 Progress Met  -CN     LTG 2 Pt will demonstrate B hip strength to 4/5 or better in order to improve balance reactions.  -CN     LTG 2 Progress Partially Met  -CN     LTG 3 Pt will be able to achieve 10 STS during 30 sec without UEs in order to demontrate improved endurance with ADLs.  -CN     LTG 3 Progress Not Met  -CN     LTG 4 Pt will be able to perform floor transfer in order to increase safety at home.  -CN     LTG 4 Progress Met  -CN     LTG 4 Progress Comments Able to perform with small step or chair present.  -CN               User Key  (r) = Recorded By, (t) = Taken By, (c) = Cosigned By      Initials Name Provider Type    Mabel Abdullahi, PT Physical Therapist                    Therapy Education  Given: HEP, Symptoms/condition management, Pain management  Program: Reinforced  How Provided: Verbal, Demonstration  Provided to: Patient  Level of Understanding: Teach back education performed, Verbalized, Demonstrated    30 Second Chair Stand Test  30 Second Chair Stand Test: 6 (no UEs)         Time Calculation:   Start  Time: 1530  Stop Time: 1600  Time Calculation (min): 30 min  Timed Charges  51111 - PT Therapeutic Exercise Minutes: 15  61948 - PT Therapeutic Activity Minutes: 15  Total Minutes  Timed Charges Total Minutes: 30   Total Minutes: 30  Therapy Charges for Today       Code Description Service Date Service Provider Modifiers Qty    [id number]  PT THER PROC EA 15 MIN 8/9/2023 Mabel Abdi, PT GP 1    62325013459 HC PT THERAPEUTIC ACT EA 15 MIN 8/9/2023 Mabel Abdi, PT GP 1                  OP PT Discharge Summary  Date of Discharge: 08/09/23  Reason for Discharge: Maximum functional potential achieved, Independent  Outcomes Achieved: Patient able to partially acheive established goals  Discharge Destination: Home with home program  Discharge Instructions/Additional Comments: Pt attended skilled PT for treatment of history of falls and balance impairments. Pt denies tripping at this time, partially due to new shoes and compliant with HEP. Reviewed current HEP and advised to continue at home to promote optimal balance and dec future falls.      Mabel Abdi PT  8/9/2023

## 2023-08-16 ENCOUNTER — APPOINTMENT (OUTPATIENT)
Dept: PHYSICAL THERAPY | Facility: HOSPITAL | Age: 78
End: 2023-08-16
Payer: MEDICARE

## 2023-10-04 ENCOUNTER — OFFICE VISIT (OUTPATIENT)
Dept: INTERNAL MEDICINE | Facility: CLINIC | Age: 78
End: 2023-10-04
Payer: MEDICARE

## 2023-10-04 VITALS
HEART RATE: 64 BPM | WEIGHT: 168.4 LBS | BODY MASS INDEX: 24.94 KG/M2 | DIASTOLIC BLOOD PRESSURE: 80 MMHG | HEIGHT: 69 IN | SYSTOLIC BLOOD PRESSURE: 130 MMHG | OXYGEN SATURATION: 97 %

## 2023-10-04 DIAGNOSIS — G25.81 RESTLESS LEG SYNDROME: ICD-10-CM

## 2023-10-04 DIAGNOSIS — E03.9 HYPOTHYROIDISM, UNSPECIFIED TYPE: ICD-10-CM

## 2023-10-04 DIAGNOSIS — R60.9 DEPENDENT EDEMA: ICD-10-CM

## 2023-10-04 DIAGNOSIS — R73.03 PREDIABETES: ICD-10-CM

## 2023-10-04 DIAGNOSIS — I10 PRIMARY HYPERTENSION: ICD-10-CM

## 2023-10-04 DIAGNOSIS — E78.5 HYPERLIPIDEMIA, UNSPECIFIED HYPERLIPIDEMIA TYPE: ICD-10-CM

## 2023-10-04 DIAGNOSIS — Z00.00 MEDICARE ANNUAL WELLNESS VISIT, SUBSEQUENT: Primary | ICD-10-CM

## 2023-10-04 DIAGNOSIS — E61.1 IRON DEFICIENCY: ICD-10-CM

## 2023-10-04 DIAGNOSIS — Z23 NEED FOR INFLUENZA VACCINATION: ICD-10-CM

## 2023-10-04 RX ORDER — ROPINIROLE 0.5 MG/1
0.5 TABLET, FILM COATED ORAL NIGHTLY
Qty: 90 TABLET | Refills: 2 | Status: SHIPPED | OUTPATIENT
Start: 2023-10-04

## 2023-10-04 NOTE — PROGRESS NOTES
The ABCs of the Annual Wellness Visit  Subsequent Medicare Wellness Visit    Chief Complaint   Patient presents with    Medicare Wellness-subsequent      Subjective    History of Present Illness:  Sheela Soto is a 78 y.o. female with past medical history significant for hyperlipidemia, hypertension, GERD, depression, restless leg syndrome, hypothyroidism who presents for a Subsequent Medicare Wellness Visit.    The following portions of the patient's history were reviewed and   updated as appropriate: allergies, current medications, past family history, past medical history, past social history, past surgical history, and problem list.  Hyperlipidemia-started on rosuvastatin at recent appointment    Prediabetes    On iron replacement for RLS. Sleeping better with RLS.     Blood pressure elevated. She ordered a new blood pressure monitor. BP fluctuates at home. On HCTZ and losartan. Has some mild LE edema. She has sleep apnea    Recent DEXA and mammogram were normal.     PT for falls is helpful. She is picking her feet up more.     Works part time in her [employer].     Takes half a Prilosec every day.    Compared to one year ago, the patient feels her physical   health is better.    Compared to one year ago, the patient feels her mental   health is better.    Recent Hospitalizations:  She was not admitted to the hospital during the last year.       Current Medical Providers:  Patient Care Team:  Stefan Valencia MD as PCP - General (Internal Medicine)  Laura Fuller MD as Consulting Physician (Urogynecology)      Outpatient Medications Prior to Visit   Medication Sig Dispense Refill    Cholecalciferol (Vitamin D3) 75 MCG (3000 UT) tablet Take  by mouth.      estrogens, conjugated, (PREMARIN) 0.625 MG tablet Take 1 tablet by mouth Daily. 90 tablet 2    ferrous gluconate (FERGON) 324 MG tablet Take 1 tablet by mouth Every Other Day. 45 tablet 2    hydroCHLOROthiazide (HYDRODIURIL) 12.5 MG tablet Take 1 tablet by mouth  "Daily. 90 tablet 2    levothyroxine (SYNTHROID, LEVOTHROID) 112 MCG tablet Take 1 tablet by mouth Daily. 90 tablet 2    losartan (COZAAR) 100 MG tablet Take 1 tablet by mouth Daily. 90 tablet 2    Omeprazole 20 MG Tablet Delayed Release Dispersible   Oral, Daily, 0 Refill(s)      rosuvastatin (Crestor) 20 MG tablet Take 1 tablet by mouth Daily. 90 tablet 2    sertraline (ZOLOFT) 100 MG tablet Take 1 tablet by mouth Daily. 90 tablet 2    Zinc Sulfate 220 (50 Zn) MG tablet Take  by mouth.      rOPINIRole (REQUIP) 0.25 MG tablet Take 1 tablet by mouth Every Night. Take 1 hour before bedtime. 90 tablet 2     No facility-administered medications prior to visit.       No opioid medication identified on active medication list. I have reviewed chart for other potential  high risk medication/s and harmful drug interactions in the elderly.        Aspirin is not on active medication list.  Aspirin use is not indicated based on review of current medical condition/s. Risk of harm outweighs potential benefits.  .    Patient Active Problem List   Diagnosis    Colon polyps    Depression    GERD (gastroesophageal reflux disease)    Hyperlipidemia    Hypertension    Right knee DJD    Thyroid disorder    Restless leg syndrome     Advance Care Planning  Advance Directive is on file.  ACP discussion was held with the patient during this visit. Patient has an advance directive in EMR which is still valid.           Objective    Vitals:    10/04/23 1040 10/04/23 1116   BP: 147/56 130/80   Pulse: 64    SpO2: 97%    Weight: 76.4 kg (168 lb 6.4 oz)    Height: 174 cm (68.5\")      Estimated body mass index is 25.23 kg/m² as calculated from the following:    Height as of this encounter: 174 cm (68.5\").    Weight as of this encounter: 76.4 kg (168 lb 6.4 oz).    BMI is within normal parameters. No other follow-up for BMI required.      Does the patient have evidence of cognitive impairment? No    Physical Exam  Constitutional:       Appearance: " Normal appearance. She is normal weight.   HENT:      Right Ear: Tympanic membrane normal.      Left Ear: Tympanic membrane normal.   Cardiovascular:      Rate and Rhythm: Normal rate and regular rhythm.      Heart sounds: Normal heart sounds. No murmur heard.  Pulmonary:      Effort: Pulmonary effort is normal.      Breath sounds: Normal breath sounds.   Abdominal:      General: Abdomen is flat. There is no distension.      Palpations: Abdomen is soft.      Tenderness: There is no abdominal tenderness.   Musculoskeletal:      Right lower leg: Edema present.      Left lower leg: Edema present.   Skin:     General: Skin is warm and dry.   Neurological:      Mental Status: She is alert.   Psychiatric:         Mood and Affect: Mood normal.         Behavior: Behavior normal.         Thought Content: Thought content normal.         Varicose veins.       HEALTH RISK ASSESSMENT    Smoking Status:  Social History     Tobacco Use   Smoking Status Never   Smokeless Tobacco Never     Alcohol Consumption:  Social History     Substance and Sexual Activity   Alcohol Use Yes    Alcohol/week: 3.0 standard drinks    Types: 3 Glasses of wine per week     Fall Risk Screen:    [facility] Fall Risk Assessment was completed, and patient is at LOW risk for falls.Assessment completed on:10/4/2023    Depression Screening:      10/4/2023    10:42 AM   PHQ-2/PHQ-9 Depression Screening   Little Interest or Pleasure in Doing Things 0-->not at all   Feeling Down, Depressed or Hopeless 0-->not at all   PHQ-9: Brief Depression Severity Measure Score 0       Health Habits and Functional and Cognitive Screening:      10/4/2023    10:41 AM   Functional & Cognitive Status   Do you have difficulty preparing food and eating? No   Do you have difficulty bathing yourself, getting dressed or grooming yourself? No   Do you have difficulty using the toilet? No   Do you have difficulty moving around from place to place? No   Do you have trouble with steps or  getting out of a bed or a chair? No   Do you need help using the phone?  No   Are you deaf or do you have serious difficulty hearing?  No   Do you need help to go to places out of walking distance? No   Do you need help shopping? No   Do you need help preparing meals?  No   Do you need help with housework?  No   Do you need help with laundry? No   Do you need help taking your medications? No   Do you need help managing money? No   Do you ever drive or ride in a car without wearing a seat belt? No       Age-appropriate Screening Schedule:  Refer to the list below for future screening recommendations based on patient's age, sex and/or medical conditions. Orders for these recommended tests are listed in the plan section. The patient has been provided with a written plan.    Health Maintenance   Topic Date Due    ZOSTER VACCINE (1 of 2) Never done    COVID-19 Vaccine (5 - 2023-24 season) 09/01/2023    TDAP/TD VACCINES (1 - Tdap) 10/04/2024 (Originally 1/1/1964)    LIPID PANEL  03/30/2024    BMI FOLLOWUP  05/11/2024    ANNUAL WELLNESS VISIT  10/04/2024    DXA SCAN  07/20/2025    HEPATITIS C SCREENING  Completed    INFLUENZA VACCINE  Completed    Pneumococcal Vaccine 65+  Completed              Assessment & Plan   CMS Preventative Services Quick Reference  Risk Factors Identified During Encounter  None Identified  Due for Shingles and COVID boodter.   The above risks/problems have been discussed with the patient.  Follow up actions/plans if indicated are seen below in the Assessment/Plan Section.  Pertinent information has been shared with the patient in the After Visit Summary.    Diagnoses and all orders for this visit:    1. Medicare annual wellness visit, subsequent (Primary)    2. Primary hypertension  -     Comprehensive Metabolic Panel    Patient was asked to check their BP 3-5 times weekly at various times of day after being seated for 5 minutes or longer. Discussed that goal blood pressure is less than 140  systolic and less than 90 diastolic. Asked patient to bring log to next visit.     BP variable. Continue current management    3. Prediabetes  Work on diet and lifestyle changes.  Monitor yearly.    4. Hyperlipidemia, unspecified hyperlipidemia type  -     Comprehensive Metabolic Panel  -     Lipid Panel    5. Iron deficiency  -     CBC & Differential  -     Iron and TIBC  -     Ferritin    6. Hypothyroidism, unspecified type  -     TSH Rfx On Abnormal To Free T4    7. Dependent edema  Elevated feet and compression stockings for edema.     8. Restless leg syndrome  -     Iron and TIBC  -     Ferritin  -     rOPINIRole (REQUIP) 0.5 MG tablet; Take 1 tablet by mouth Every Night. Take 1 hour before bedtime.  Dispense: 90 tablet; Refill: 2      RLS improved on ropinirole. Will increse since symptoms suboptimally controlled.            Follow Up:   Return in about 7 months (around 5/4/2024) for Recheck.     An After Visit Summary and PPPS were made available to the patient.

## 2023-10-04 NOTE — PATIENT INSTRUCTIONS
Patient was asked to check their BP 3-5 times weekly at various times of day after being seated for 5 minutes or longer. Discussed that goal blood pressure is less than 140 systolic and less than 90 diastolic. Asked patient to bring log to next visit.

## 2023-10-05 LAB
ALBUMIN SERPL-MCNC: 4.5 G/DL (ref 3.5–5.2)
ALBUMIN/GLOB SERPL: 2.4 G/DL
ALP SERPL-CCNC: 69 U/L (ref 39–117)
ALT SERPL-CCNC: 14 U/L (ref 1–33)
AST SERPL-CCNC: 21 U/L (ref 1–32)
BASOPHILS # BLD AUTO: 0.04 10*3/MM3 (ref 0–0.2)
BASOPHILS NFR BLD AUTO: 0.8 % (ref 0–1.5)
BILIRUB SERPL-MCNC: 0.7 MG/DL (ref 0–1.2)
BUN SERPL-MCNC: 16 MG/DL (ref 8–23)
BUN/CREAT SERPL: 26.2 (ref 7–25)
CALCIUM SERPL-MCNC: 9.7 MG/DL (ref 8.6–10.5)
CHLORIDE SERPL-SCNC: 104 MMOL/L (ref 98–107)
CHOLEST SERPL-MCNC: 174 MG/DL (ref 0–200)
CO2 SERPL-SCNC: 27.5 MMOL/L (ref 22–29)
CREAT SERPL-MCNC: 0.61 MG/DL (ref 0.57–1)
EGFRCR SERPLBLD CKD-EPI 2021: 91.1 ML/MIN/1.73
EOSINOPHIL # BLD AUTO: 0.11 10*3/MM3 (ref 0–0.4)
EOSINOPHIL NFR BLD AUTO: 2.1 % (ref 0.3–6.2)
ERYTHROCYTE [DISTWIDTH] IN BLOOD BY AUTOMATED COUNT: 12.1 % (ref 12.3–15.4)
FERRITIN SERPL-MCNC: 51.7 NG/ML (ref 13–150)
GLOBULIN SER CALC-MCNC: 1.9 GM/DL
GLUCOSE SERPL-MCNC: 96 MG/DL (ref 65–99)
HCT VFR BLD AUTO: 36.9 % (ref 34–46.6)
HDLC SERPL-MCNC: 87 MG/DL (ref 40–60)
HGB BLD-MCNC: 12.6 G/DL (ref 12–15.9)
IMM GRANULOCYTES # BLD AUTO: 0.02 10*3/MM3 (ref 0–0.05)
IMM GRANULOCYTES NFR BLD AUTO: 0.4 % (ref 0–0.5)
IRON SATN MFR SERPL: 24 % (ref 20–50)
IRON SERPL-MCNC: 96 MCG/DL (ref 37–145)
LDLC SERPL CALC-MCNC: 74 MG/DL (ref 0–100)
LYMPHOCYTES # BLD AUTO: 1.74 10*3/MM3 (ref 0.7–3.1)
LYMPHOCYTES NFR BLD AUTO: 33.9 % (ref 19.6–45.3)
MCH RBC QN AUTO: 31.6 PG (ref 26.6–33)
MCHC RBC AUTO-ENTMCNC: 34.1 G/DL (ref 31.5–35.7)
MCV RBC AUTO: 92.5 FL (ref 79–97)
MONOCYTES # BLD AUTO: 0.35 10*3/MM3 (ref 0.1–0.9)
MONOCYTES NFR BLD AUTO: 6.8 % (ref 5–12)
NEUTROPHILS # BLD AUTO: 2.87 10*3/MM3 (ref 1.7–7)
NEUTROPHILS NFR BLD AUTO: 56 % (ref 42.7–76)
NRBC BLD AUTO-RTO: 0.2 /100 WBC (ref 0–0.2)
PLATELET # BLD AUTO: 237 10*3/MM3 (ref 140–450)
POTASSIUM SERPL-SCNC: 4.5 MMOL/L (ref 3.5–5.2)
PROT SERPL-MCNC: 6.4 G/DL (ref 6–8.5)
RBC # BLD AUTO: 3.99 10*6/MM3 (ref 3.77–5.28)
SODIUM SERPL-SCNC: 139 MMOL/L (ref 136–145)
TIBC SERPL-MCNC: 399 MCG/DL
TRIGL SERPL-MCNC: 70 MG/DL (ref 0–150)
TSH SERPL DL<=0.005 MIU/L-ACNC: 0.49 UIU/ML (ref 0.27–4.2)
UIBC SERPL-MCNC: 303 MCG/DL (ref 112–346)
VLDLC SERPL CALC-MCNC: 13 MG/DL (ref 5–40)
WBC # BLD AUTO: 5.13 10*3/MM3 (ref 3.4–10.8)

## 2023-10-26 DIAGNOSIS — E78.5 HYPERLIPIDEMIA, UNSPECIFIED HYPERLIPIDEMIA TYPE: ICD-10-CM

## 2023-10-27 RX ORDER — ROSUVASTATIN CALCIUM 20 MG/1
20 TABLET, COATED ORAL DAILY
Qty: 90 TABLET | Refills: 3 | Status: SHIPPED | OUTPATIENT
Start: 2023-10-27

## 2024-03-28 ENCOUNTER — TELEPHONE (OUTPATIENT)
Dept: INTERNAL MEDICINE | Facility: CLINIC | Age: 79
End: 2024-03-28

## 2024-03-28 NOTE — TELEPHONE ENCOUNTER
Caller: Sheela Soto    Relationship to patient: Self    Best call back number: [phone]     Chief complaint: RIGHT LEG PAIN, SWELLING, NOT SLEEPING DUE TO PAIN    Type of visit: OFFICE    Requested date: 3/29 AFTER 11 AM     Additional notes: PLEASE CALL PATIENT TO SCHEDULE, PATIENT COULD NOT COME IN  AM WITH APRN.

## 2024-03-28 NOTE — TELEPHONE ENCOUNTER
Called patient and left voice mail informing patient Dr. Valencia was on leave and we would try to get her an appointment scheduled with one of our other providers. I also informed her that if her symptoms get worse to go to the UC or ER.

## 2024-03-29 ENCOUNTER — OFFICE VISIT (OUTPATIENT)
Dept: INTERNAL MEDICINE | Facility: CLINIC | Age: 79
End: 2024-03-29
Payer: MEDICARE

## 2024-03-29 VITALS
BODY MASS INDEX: 23.7 KG/M2 | SYSTOLIC BLOOD PRESSURE: 130 MMHG | WEIGHT: 160 LBS | HEART RATE: 79 BPM | DIASTOLIC BLOOD PRESSURE: 70 MMHG | OXYGEN SATURATION: 96 % | HEIGHT: 69 IN

## 2024-03-29 DIAGNOSIS — R60.0 BILATERAL LOWER EXTREMITY EDEMA: Primary | ICD-10-CM

## 2024-03-29 DIAGNOSIS — G25.81 RESTLESS LEG SYNDROME: ICD-10-CM

## 2024-03-29 PROCEDURE — 3075F SYST BP GE 130 - 139MM HG: CPT | Performed by: STUDENT IN AN ORGANIZED HEALTH CARE EDUCATION/TRAINING PROGRAM

## 2024-03-29 PROCEDURE — 99214 OFFICE O/P EST MOD 30 MIN: CPT | Performed by: STUDENT IN AN ORGANIZED HEALTH CARE EDUCATION/TRAINING PROGRAM

## 2024-03-29 PROCEDURE — 3078F DIAST BP <80 MM HG: CPT | Performed by: STUDENT IN AN ORGANIZED HEALTH CARE EDUCATION/TRAINING PROGRAM

## 2024-03-29 RX ORDER — ROPINIROLE 1 MG/1
1 TABLET, FILM COATED ORAL NIGHTLY
Qty: 30 TABLET | Refills: 0 | Status: SHIPPED | OUTPATIENT
Start: 2024-03-29

## 2024-03-29 RX ORDER — HYDROCHLOROTHIAZIDE 25 MG/1
25 TABLET ORAL DAILY
Qty: 30 TABLET | Refills: 0 | Status: SHIPPED | OUTPATIENT
Start: 2024-03-29

## 2024-03-29 NOTE — PROGRESS NOTES
Cezar Frank D.O.  Internal Medicine  [facility]  [address], [address]  [place] [address]  [phone]      Chief Complaint  Swelling in right leg     SUBJECTIVE    History of Present Illness    Sheela Soto is a 79 y.o. female who presents to the office today as an established patient of Dr Valencia here for acute care visit.     Pt states she had a history of leg swelling with her right leg and was placed on a water pill several months ago with HCTZ 12.5 mg daily.   States she has dealt with right leg swelling for around 9 months. States now there is pain around the ankle and lower right leg. She has compression stockings which do help. States at first she felt HCTZ was helping but now not so much.     Restless leg syndrome: started on ropinirole 0.5 mg nightly around October 2023. Seemed to help at first but now over the last few months symptoms are resurfacing. Right now the symptoms are severely impacting sleep.   Allergies   Allergen Reactions    Codeine Nausea Only    Latex Rash     Doesn't like to wear latex gloves          Outpatient Medications Marked as Taking for the 3/29/24 encounter (Office Visit) with Cezar Frank, DO   Medication Sig Dispense Refill    Cholecalciferol (Vitamin D3) 75 MCG (3000 UT) tablet Take  by mouth.      estrogens, conjugated, (PREMARIN) 0.625 MG tablet Take 1 tablet by mouth Daily. 90 tablet 2    ferrous gluconate (FERGON) 324 MG tablet Take 1 tablet by mouth Every Other Day. 45 tablet 2    levothyroxine (SYNTHROID, LEVOTHROID) 112 MCG tablet Take 1 tablet by mouth Daily. 90 tablet 2    losartan (COZAAR) 100 MG tablet Take 1 tablet by mouth Daily. 90 tablet 2    Omeprazole 20 MG Tablet Delayed Release Dispersible   Oral, Daily, 0 Refill(s)      rosuvastatin (CRESTOR) 20 MG tablet TAKE 1 TABLET BY MOUTH DAILY 90 tablet 3    sertraline (ZOLOFT) 100 MG tablet Take 1 tablet by mouth Daily. 90 tablet 2    Zinc Sulfate 220 (50 Zn) MG tablet Take  by  "mouth.      [DISCONTINUED] hydroCHLOROthiazide (HYDRODIURIL) 12.5 MG tablet Take 1 tablet by mouth Daily. 90 tablet 2    [DISCONTINUED] rOPINIRole (REQUIP) 0.5 MG tablet Take 1 tablet by mouth Every Night. Take 1 hour before bedtime. 90 tablet 2        Past Medical History:   Diagnosis Date    Hyperlipidemia     Hypertension     Hypothyroidism     Sleep apnea        OBJECTIVE    Vital Signs:   /70   Pulse 79   Ht 174 cm (68.5\")   Wt 72.6 kg (160 lb)   SpO2 96%   BMI 23.97 kg/m²        Physical Exam  Vitals reviewed.   Constitutional:       General: She is not in acute distress.     Appearance: Normal appearance. She is normal weight. She is not ill-appearing.   Eyes:      General: No scleral icterus.  Pulmonary:      Effort: Pulmonary effort is normal. No respiratory distress.   Musculoskeletal:      Right lower leg: Edema (ankle and pretibial 1+) present.      Left lower leg: Edema (ankle and pretibial trace) present.      Comments: Bilateral calf measurement of 25 cm. No ttp in the deep veins on the right lower leg and knee. Negative gaurav's sign on the right.   Skin:     Coloration: Skin is not jaundiced.   Neurological:      Mental Status: She is alert.   Psychiatric:         Mood and Affect: Mood normal.         Behavior: Behavior normal.         Thought Content: Thought content normal.                             ASSESSMENT & PLAN     Diagnoses and all orders for this visit:    1. Bilateral lower extremity edema (Primary)  -Pt states she had a history of leg swelling with her right leg and was placed on a water pill several months ago with HCTZ 12.5 mg daily.   States she has dealt with right leg swelling for around 9 months. States now there is pain around the ankle and lower right leg. She has compression stockings which do help. States at first she felt HCTZ was helping but now not so much.  -pt with chronic bilateral low extremity edema, R slightly greater than left, presents with concerns of " increasing swelling that is no longer responsive to her HCTZ. Exam not very concerning for DVT. Will check D-Dimer. I suspect she has chronic venous stasis and some component of decreased fluid movement due to history of bilateral knee replacements. She should continue wearing her compression stockings daily. I will check BMP today to ensure electrolytes are tolerating diuretic and will increase her HCTZ to 25 mg daily. Bring her back in 4 weeks for follow up and electrolyte check.    2. Restless leg syndrome  -started on ropinirole 0.5 mg nightly around October 2023. Seemed to help at first but now over the last few months symptoms are resurfacing. Right now the symptoms are severely impacting sleep.   -will increase ropinirole to 1 mg nightly and see how she does over the next month  - iron levels checked last year and normal            Follow Up  Return in about 4 weeks (around 4/26/2024) for Recheck.    Patient/family had no further questions at this time and verbalized understanding of the plan discussed today.

## 2024-03-30 LAB
BUN SERPL-MCNC: 18 MG/DL (ref 8–27)
BUN/CREAT SERPL: 32 (ref 12–28)
CALCIUM SERPL-MCNC: 9.2 MG/DL (ref 8.7–10.3)
CHLORIDE SERPL-SCNC: 102 MMOL/L (ref 96–106)
CO2 SERPL-SCNC: 21 MMOL/L (ref 20–29)
CREAT SERPL-MCNC: 0.56 MG/DL (ref 0.57–1)
D DIMER PPP FEU-MCNC: 0.45 MG/L FEU (ref 0–0.49)
EGFRCR SERPLBLD CKD-EPI 2021: 92 ML/MIN/1.73
GLUCOSE SERPL-MCNC: 120 MG/DL (ref 70–99)
POTASSIUM SERPL-SCNC: 4.1 MMOL/L (ref 3.5–5.2)
SODIUM SERPL-SCNC: 137 MMOL/L (ref 134–144)

## 2024-04-04 NOTE — TELEPHONE ENCOUNTER
Caller: Sheela Soto    Relationship: Self    Best call back number: [phone]    Requested Prescriptions:   Requested Prescriptions     Pending Prescriptions Disp Refills    estrogens, conjugated, (PREMARIN) 0.625 MG tablet 90 tablet 2     Sig: Take 1 tablet by mouth Daily.        Pharmacy where request should be sent: [facility] - [place], [place] - [address] - [phone]  - [phone] FX     Last office visit with prescribing clinician: 10/4/2023   Last telemedicine visit with prescribing clinician: Visit date not found   Next office visit with prescribing clinician: 5/23/2024     Does the patient have less than a 3 day supply:  [x] Yes  [] No    Would you like a call back once the refill request has been completed: [x] Yes [] No    If the office needs to give you a call back, can they leave a voicemail: [x] Yes [] No    Aarti Ordaz Rep   04/04/24 13:44 EDT

## 2024-04-29 ENCOUNTER — OFFICE VISIT (OUTPATIENT)
Dept: INTERNAL MEDICINE | Facility: CLINIC | Age: 79
End: 2024-04-29
Payer: MEDICARE

## 2024-04-29 VITALS
WEIGHT: 156 LBS | BODY MASS INDEX: 25.07 KG/M2 | HEART RATE: 70 BPM | DIASTOLIC BLOOD PRESSURE: 80 MMHG | HEIGHT: 66 IN | SYSTOLIC BLOOD PRESSURE: 158 MMHG | OXYGEN SATURATION: 95 %

## 2024-04-29 DIAGNOSIS — R60.0 BILATERAL LOWER EXTREMITY EDEMA: ICD-10-CM

## 2024-04-29 DIAGNOSIS — G25.81 RESTLESS LEG SYNDROME: ICD-10-CM

## 2024-04-29 DIAGNOSIS — I10 ESSENTIAL HYPERTENSION: Primary | ICD-10-CM

## 2024-04-29 PROCEDURE — 3079F DIAST BP 80-89 MM HG: CPT | Performed by: STUDENT IN AN ORGANIZED HEALTH CARE EDUCATION/TRAINING PROGRAM

## 2024-04-29 PROCEDURE — 3077F SYST BP >= 140 MM HG: CPT | Performed by: STUDENT IN AN ORGANIZED HEALTH CARE EDUCATION/TRAINING PROGRAM

## 2024-04-29 PROCEDURE — 99214 OFFICE O/P EST MOD 30 MIN: CPT | Performed by: STUDENT IN AN ORGANIZED HEALTH CARE EDUCATION/TRAINING PROGRAM

## 2024-04-29 RX ORDER — ROPINIROLE 0.5 MG/1
0.5 TABLET, FILM COATED ORAL NIGHTLY
Qty: 30 TABLET | Refills: 0 | Status: SHIPPED | OUTPATIENT
Start: 2024-04-29

## 2024-04-29 NOTE — PROGRESS NOTES
"  Cezar Frank D.O.  Internal Medicine  [facility]  [address], [address]  [place] [address]  [phone]      Chief Complaint  Restless Legs Syndrome    SUBJECTIVE    History of Present Illness    Sheela Soto is a 79 y.o. female who presents to the office today as an established patient that last saw me on 3/29/2024.     Restless leg syndrome: at last visit increased ropinirole from 0.5 mg nightly to 1 mg nightly . No further symptom improvement with this dosage increase.     HTN: states she stopped losartan 100 mg daily since last visit because she felt low energy. States her diastolic was running in the 50s. States her sensation of low energy has not improved. She was getting lightheaded when bending over but that has resolved.  Home BP low below:         Chronic leg swelling: at last visit increased HCTZ 25 mg daily from 12.5 mg daily.   She wears compression stockings every day which do help \"they feel good\" when wearing them. Unfortunately when she takes them off leg swelling increases.       Allergies   Allergen Reactions    Codeine Nausea Only    Latex Rash     Doesn't like to wear latex gloves          Outpatient Medications Marked as Taking for the 4/29/24 encounter (Office Visit) with Cezar Frank, DO   Medication Sig Dispense Refill    Cholecalciferol (Vitamin D3) 75 MCG (3000 UT) tablet Take  by mouth.      estrogens, conjugated, (PREMARIN) 0.625 MG tablet Take 1 tablet by mouth Daily. 90 tablet 2    ferrous gluconate (FERGON) 324 MG tablet Take 1 tablet by mouth Every Other Day. 45 tablet 2    hydroCHLOROthiazide 25 MG tablet Take 1 tablet by mouth Daily. 30 tablet 0    levothyroxine (SYNTHROID, LEVOTHROID) 112 MCG tablet Take 1 tablet by mouth Daily. 90 tablet 2    Omeprazole 20 MG Tablet Delayed Release Dispersible   Oral, Daily, 0 Refill(s)      rOPINIRole (REQUIP) 1 MG tablet Take 1 tablet by mouth Every Night. Take 1 hour before bedtime. 30 tablet 0    sertraline " "(ZOLOFT) 100 MG tablet Take 1 tablet by mouth Daily. 90 tablet 2    Zinc Sulfate 220 (50 Zn) MG tablet Take  by mouth.          Past Medical History:   Diagnosis Date    Hyperlipidemia     Hypertension     Hypothyroidism     Sleep apnea        OBJECTIVE    Vital Signs:   /80 (BP Location: Left arm)   Pulse 70   Ht 166.4 cm (65.5\")   Wt 70.8 kg (156 lb)   SpO2 95%   BMI 25.56 kg/m²        Physical Exam  Vitals reviewed.   Constitutional:       General: She is not in acute distress.     Appearance: She is not ill-appearing.   Eyes:      General: No scleral icterus.  Cardiovascular:      Rate and Rhythm: Normal rate and regular rhythm.      Heart sounds: Normal heart sounds. No murmur heard.  Pulmonary:      Effort: Pulmonary effort is normal. No respiratory distress.      Breath sounds: Normal breath sounds. No wheezing.   Musculoskeletal:      Right lower leg: Edema (2+ ankle and lower extremity below the knee) present.      Left lower leg: Edema (trace to 1 + ankle and lower extremity below the knee) present.   Neurological:      Mental Status: She is alert.   Psychiatric:         Mood and Affect: Mood normal.         Behavior: Behavior normal.         Thought Content: Thought content normal.                             ASSESSMENT & PLAN     Diagnoses and all orders for this visit:    1. Essential hypertension (Primary)  -seems to become hypotensive with increase in HCTZ for lower extremity swelling (see below) and she self discontinued losartan and statin . I reviewed her home BP log and it is well controlled at home. BP high in office at 158/80 but she states she was rushing to the office today. Check BMP. I am OK if she stops her losartan for now and she will need to discuss the statin use long term with her PCP next month.   -     Basic metabolic panel    2. Restless leg syndrome  - at last visit increased ropinirole from 0.5 mg nightly to 1 mg nightly . No further symptom improvement with this dosage " "increase.  -offered increase in ropinirole by adding a 0.5 mg tab to her 1 mg tab and she is agreeable.   -     rOPINIRole (REQUIP) 0.5 MG tablet; Take 1 tablet by mouth Every Night. Take 1 hour before bedtime. Take in addition to ropinirole 1 mg weekly.  Dispense: 30 tablet; Refill: 0    3. Bilateral lower extremity edema       -at last visit increased HCTZ 25 mg daily from 12.5 mg daily.   She wears compression stockings every day which do help \"they feel good\" when wearing them. Unfortunately when she takes them off leg swelling increases.   -she believes her premarin may be the cause but I advised her this is a side effect in  less than 1% of patients . She is going to discuss with a GYN.   -at last visit I obtained a ddimer and it was normal. I offered a duplex venous U/S to assess for any chronic clot in the RLE which has the worse swelling as this has not been done but she declines today.For now I do not recommend any further increase in diuretic and she can continue to address with her PCP next month.          Follow Up  No follow-ups on file.    Patient/family had no further questions at this time and verbalized understanding of the plan discussed today.   "

## 2024-04-30 LAB
BUN SERPL-MCNC: 18 MG/DL (ref 8–27)
BUN/CREAT SERPL: 27 (ref 12–28)
CALCIUM SERPL-MCNC: 9.4 MG/DL (ref 8.7–10.3)
CHLORIDE SERPL-SCNC: 100 MMOL/L (ref 96–106)
CO2 SERPL-SCNC: 24 MMOL/L (ref 20–29)
CREAT SERPL-MCNC: 0.67 MG/DL (ref 0.57–1)
EGFRCR SERPLBLD CKD-EPI 2021: 88 ML/MIN/1.73
GLUCOSE SERPL-MCNC: 86 MG/DL (ref 70–99)
POTASSIUM SERPL-SCNC: 3.9 MMOL/L (ref 3.5–5.2)
SODIUM SERPL-SCNC: 138 MMOL/L (ref 134–144)

## 2024-05-01 DIAGNOSIS — G25.81 RESTLESS LEG SYNDROME: ICD-10-CM

## 2024-05-01 RX ORDER — ROPINIROLE 1 MG/1
1 TABLET, FILM COATED ORAL NIGHTLY
Qty: 30 TABLET | Refills: 0 | Status: SHIPPED | OUTPATIENT
Start: 2024-05-01

## 2024-05-23 ENCOUNTER — OFFICE VISIT (OUTPATIENT)
Dept: INTERNAL MEDICINE | Facility: CLINIC | Age: 79
End: 2024-05-23
Payer: MEDICARE

## 2024-05-23 VITALS
HEART RATE: 66 BPM | BODY MASS INDEX: 24.85 KG/M2 | WEIGHT: 154.6 LBS | HEIGHT: 66 IN | SYSTOLIC BLOOD PRESSURE: 168 MMHG | OXYGEN SATURATION: 97 % | DIASTOLIC BLOOD PRESSURE: 63 MMHG

## 2024-05-23 DIAGNOSIS — N89.8 VAGINAL DRYNESS: ICD-10-CM

## 2024-05-23 DIAGNOSIS — G25.81 RESTLESS LEG SYNDROME: Primary | ICD-10-CM

## 2024-05-23 DIAGNOSIS — I10 PRIMARY HYPERTENSION: ICD-10-CM

## 2024-05-23 DIAGNOSIS — R60.0 BILATERAL LEG EDEMA: ICD-10-CM

## 2024-05-23 DIAGNOSIS — E78.5 HYPERLIPIDEMIA, UNSPECIFIED HYPERLIPIDEMIA TYPE: ICD-10-CM

## 2024-05-23 LAB
BUN SERPL-MCNC: 10 MG/DL (ref 8–23)
BUN/CREAT SERPL: 17.2 (ref 7–25)
CALCIUM SERPL-MCNC: 9 MG/DL (ref 8.6–10.5)
CHLORIDE SERPL-SCNC: 99 MMOL/L (ref 98–107)
CHOLEST SERPL-MCNC: 264 MG/DL (ref 0–200)
CO2 SERPL-SCNC: 26.6 MMOL/L (ref 22–29)
CREAT SERPL-MCNC: 0.58 MG/DL (ref 0.57–1)
EGFRCR SERPLBLD CKD-EPI 2021: 91.6 ML/MIN/1.73
GLUCOSE SERPL-MCNC: 89 MG/DL (ref 65–99)
HDLC SERPL-MCNC: 94 MG/DL (ref 40–60)
LDLC SERPL CALC-MCNC: 155 MG/DL (ref 0–100)
LDLC/HDLC SERPL: 1.62 {RATIO}
POTASSIUM SERPL-SCNC: 4.2 MMOL/L (ref 3.5–5.2)
SODIUM SERPL-SCNC: 137 MMOL/L (ref 136–145)
TRIGL SERPL-MCNC: 90 MG/DL (ref 0–150)
VLDLC SERPL CALC-MCNC: 15 MG/DL (ref 5–40)

## 2024-05-23 PROCEDURE — 3078F DIAST BP <80 MM HG: CPT | Performed by: STUDENT IN AN ORGANIZED HEALTH CARE EDUCATION/TRAINING PROGRAM

## 2024-05-23 PROCEDURE — 99214 OFFICE O/P EST MOD 30 MIN: CPT | Performed by: STUDENT IN AN ORGANIZED HEALTH CARE EDUCATION/TRAINING PROGRAM

## 2024-05-23 PROCEDURE — 3077F SYST BP >= 140 MM HG: CPT | Performed by: STUDENT IN AN ORGANIZED HEALTH CARE EDUCATION/TRAINING PROGRAM

## 2024-05-23 RX ORDER — ESTRADIOL 0.1 MG/G
2 CREAM VAGINAL DAILY
Qty: 42.5 G | Refills: 2 | Status: SHIPPED | OUTPATIENT
Start: 2024-05-23

## 2024-05-23 RX ORDER — LOSARTAN POTASSIUM 25 MG/1
25 TABLET ORAL DAILY
Qty: 90 TABLET | Refills: 2 | Status: SHIPPED | OUTPATIENT
Start: 2024-05-23

## 2024-05-23 RX ORDER — ROPINIROLE 1 MG/1
1 TABLET, FILM COATED ORAL 2 TIMES DAILY
Qty: 30 TABLET | Refills: 0 | Status: SHIPPED | OUTPATIENT
Start: 2024-05-23

## 2024-05-23 RX ORDER — FUROSEMIDE 40 MG/1
40 TABLET ORAL DAILY
Qty: 30 TABLET | Refills: 0 | Status: SHIPPED | OUTPATIENT
Start: 2024-05-23

## 2024-05-23 NOTE — PROGRESS NOTES
Stefan Valencia M.D.  Internal Medicine  [facility]  [address], [address]  [place] [address]  [phone]      Chief Complaint  Hypertension (7 mth F/U /) and Foot Swelling (Edema right foot and ankle /)    SUBJECTIVE    History of Present Illness    Sheela Soto is a 79 y.o. female who presents to the office today as an established patient that last saw me on 10/4/2023.     She had 2 acute visits with Dr. Frank since our last appointment.  She has increased ropinirole from 0.5 mg nightly to 1 mg nightly .  She has some daytime symptoms currently.    Denies dyspnea. No shortness of breath laying down. No chest pain, pressure.     On estrogen replacement. GYN is retiring. On estrogen cream. Went to hormonal doctor. Now has estrogen patch. On iron replacement.     Today she states her blood pressure is 122/56 at home and 132/61.  She has increased her hydrochlorothiazide from 12.5-25.  She was instructed to stop losartan for concern her blood pressure was too low.  She continues to have lower extremity edema that is worse on the right.      She stopped crestor because she got something in the mail telling her not to take it.       Review of Systems    Allergies   Allergen Reactions    Codeine Nausea Only    Latex Rash     Doesn't like to wear latex gloves          Outpatient Medications Marked as Taking for the 5/23/24 encounter (Office Visit) with Steafn Valencia MD   Medication Sig Dispense Refill    Cholecalciferol (Vitamin D3) 75 MCG (3000 UT) tablet Take  by mouth.      ferrous gluconate (FERGON) 324 MG tablet Take 1 tablet by mouth Every Other Day. 45 tablet 2    levothyroxine (SYNTHROID, LEVOTHROID) 112 MCG tablet Take 1 tablet by mouth Daily. 90 tablet 2    Omeprazole 20 MG Tablet Delayed Release Dispersible   Oral, Daily, 0 Refill(s)      rOPINIRole (REQUIP) 1 MG tablet Take 1 tablet by mouth 2 (Two) Times a Day. 30 tablet 0    sertraline (ZOLOFT) 100 MG tablet Take 1 tablet by  "mouth Daily. 90 tablet 2    Zinc Sulfate 220 (50 Zn) MG tablet Take  by mouth.      [DISCONTINUED] hydroCHLOROthiazide 25 MG tablet Take 1 tablet by mouth Daily. 30 tablet 0    [DISCONTINUED] rOPINIRole (REQUIP) 0.5 MG tablet Take 1 tablet by mouth Every Night. Take 1 hour before bedtime. Take in addition to ropinirole 1 mg weekly. 30 tablet 0    [DISCONTINUED] rOPINIRole (REQUIP) 1 MG tablet Take 1 tablet by mouth Every Night. Take in addition to 0.5 mg nightly for a total of 1.5 mg weekly. 30 tablet 0    [DISCONTINUED] rosuvastatin (CRESTOR) 20 MG tablet TAKE 1 TABLET BY MOUTH DAILY 90 tablet 3        Past Medical History:   Diagnosis Date    Hyperlipidemia     Hypertension     Hypothyroidism     Sleep apnea      Past Surgical History:   Procedure Laterality Date    HYSTERECTOMY      REPLACEMENT TOTAL KNEE BILATERAL       Family History   Problem Relation Age of Onset    Pneumonia Mother     No Known Problems Brother     Autism Other     reports that she has never smoked. She has never used smokeless tobacco. She reports current alcohol use of about 3.0 standard drinks of alcohol per week.    OBJECTIVE    Vital Signs:   /63   Pulse 66   Ht 166.4 cm (65.51\")   Wt 70.1 kg (154 lb 9.6 oz)   SpO2 97%   BMI 25.33 kg/m²     Physical Exam  Constitutional:       Appearance: Normal appearance. She is normal weight.   HENT:      Right Ear: Tympanic membrane normal.      Left Ear: Tympanic membrane normal.   Cardiovascular:      Rate and Rhythm: Normal rate and regular rhythm.      Heart sounds: Normal heart sounds. No murmur heard.  Pulmonary:      Effort: Pulmonary effort is normal.      Breath sounds: Normal breath sounds.   Musculoskeletal:      Right lower leg: Edema present.      Left lower leg: Edema present.   Skin:     General: Skin is warm and dry.   Neurological:      Mental Status: She is alert.   Psychiatric:         Mood and Affect: Mood normal.         Behavior: Behavior normal.         Thought " Content: Thought content normal.            The following data was reviewed by: Stefan Valencia MD on 05/23/2024:  CMP          10/4/2023    11:33 3/29/2024    13:50 4/29/2024    14:16   CMP   Glucose 96  120  86    BUN 16  18  18    Creatinine 0.61  0.56  0.67    Sodium 139  137  138    Potassium 4.5  4.1  3.9    Chloride 104  102  100    Calcium 9.7  9.2  9.4    Total Protein 6.4      Albumin 4.5      Globulin 1.9      Total Bilirubin 0.7      Alkaline Phosphatase 69      AST (SGOT) 21      ALT (SGPT) 14      BUN/Creatinine Ratio 26.2  32  27      CBC w/diff          10/4/2023    11:33   CBC w/Diff   WBC 5.13    RBC 3.99    Hemoglobin 12.6    Hematocrit 36.9    MCV 92.5    MCH 31.6    MCHC 34.1    RDW 12.1    Platelets 237    Neutrophil Rel % 56.0    Lymphocyte Rel % 33.9    Monocyte Rel % 6.8    Eosinophil Rel % 2.1    Basophil Rel % 0.8      Lipid Panel          10/4/2023    11:33   Lipid Panel   Total Cholesterol 174    Triglycerides 70    HDL Cholesterol 87    VLDL Cholesterol 13    LDL Cholesterol  74      TSH          10/4/2023    11:33   TSH   TSH 0.490        Data reviewed : Acute care not with Dr. Frank              ASSESSMENT & PLAN     Diagnoses and all orders for this visit:    1. Restless leg syndrome (Primary)  -     rOPINIRole (REQUIP) 1 MG tablet; Take 1 tablet by mouth 2 (Two) Times a Day.  Dispense: 30 tablet; Refill: 0    2. Bilateral leg edema  -     Adult Transthoracic Echo Complete W/ Cont if Necessary Per Protocol; Future  -     furosemide (Lasix) 40 MG tablet; Take 1 tablet by mouth Daily.  Dispense: 30 tablet; Refill: 0    3. Hyperlipidemia, unspecified hyperlipidemia type  -     Lipid Panel With LDL / HDL Ratio    4. Primary hypertension  -     losartan (Cozaar) 25 MG tablet; Take 1 tablet by mouth Daily.  Dispense: 90 tablet; Refill: 2  -     Basic Metabolic Panel    5. Vaginal dryness  -     estradiol (ESTRACE) 0.1 MG/GM vaginal cream; Insert 2 g into the vagina Daily.  Dispense: 42.5 g;  Refill: 2      For her leg edema we will switch from hydrochlorothiazide to Lasix.  She will resume losartan at a lower dose.    suspect she has chronic venous stasis and some component of decreased fluid movement due to history of bilateral knee replacements. She should continue wearing her compression stockings daily.     Increase Ropinirole to BID    Does not want more mammograms. If she had an abnormal one she would not do a biopsy.     Recheck cholesterol today to determine if she needs to restart statin.        Health Maintenance Due   Topic Date Due    RSV Vaccine - Adults (1 - 1-dose 60+ series) Never done        Follow Up  Return in about 4 weeks (around 6/20/2024) for Recheck.    Patient/family had no further questions at this time and verbalized understanding of the plan discussed today.       Answers submitted by the patient for this visit:  Other (Submitted on 5/16/2024)  Please describe your symptoms.: My 6 month checkup for my prescriptions.  Still have a swollen right leg, ankle and foot from Edema.  Dr. Cezar Frank determined that my left leg, ankle and foot are also slightly swollen.  He upped my medication for this.  But both are still swollen.  High blood pressure has changed to Low.  This past year I have lost a lot of weight (not trying to lose.) Have stopped taking high blood pressure meds.  The bottom number stays in the 50’s and Dr. Frank agreed that I shouldn’t take the high blood pressure prescription right now.  I Have received both Shingle shots recently at [facility], with couple of months between them.  No problems.  Dr. Frank  upped my restless leg prescription to two separate pills, it has really helped with my sleep.  Looking forward to my appointment, hope all went will with Dr. Valencia’s new baby!  Have you had these symptoms before?: Yes  How long have you been having these symptoms?: Greater than 2 weeks  Please list any medications you are currently taking for this condition.:  They are “all” listed  on the computer for me, in Dr. Valencia’s and Dr. [facility] files.  I take several each morning.  I also have Low Thyroid, and on a prescription that works.  Please describe any probable cause for these symptoms. : I’m 79 years old.  Usually any medical problem I have gets satisfactorily solved and I move on.  The Edema has been something new for me, I don’t understand why drinking glasses of water all day, taking a diuretic, good night’s sleep, , I have never smoked, no problem breathing, the swelling remains and at times the right ankle and foot hurt, all my shoes do not fit, wearing [employer] lace up sports shoes, they have a good support inside, but still not normal fit.  Primary Reason for Visit (Submitted on 5/16/2024)  What is the primary reason for your visit?: Other

## 2024-05-24 RX ORDER — ATORVASTATIN CALCIUM 20 MG/1
20 TABLET, FILM COATED ORAL DAILY
Qty: 90 TABLET | Refills: 2 | Status: SHIPPED | OUTPATIENT
Start: 2024-05-24

## 2024-06-10 ENCOUNTER — TELEPHONE (OUTPATIENT)
Dept: CARDIOLOGY | Facility: CLINIC | Age: 79
End: 2024-06-10
Payer: MEDICARE

## 2024-06-11 ENCOUNTER — HOSPITAL ENCOUNTER (OUTPATIENT)
Dept: CARDIOLOGY | Facility: HOSPITAL | Age: 79
Discharge: HOME | End: 2024-06-11
Admitting: STUDENT IN AN ORGANIZED HEALTH CARE EDUCATION/TRAINING PROGRAM
Payer: MEDICARE

## 2024-06-11 ENCOUNTER — TELEPHONE (OUTPATIENT)
Dept: INTERNAL MEDICINE | Facility: CLINIC | Age: 79
End: 2024-06-11
Payer: MEDICARE

## 2024-06-11 VITALS
WEIGHT: 158 LBS | HEART RATE: 68 BPM | SYSTOLIC BLOOD PRESSURE: 148 MMHG | HEIGHT: 69 IN | DIASTOLIC BLOOD PRESSURE: 92 MMHG | BODY MASS INDEX: 23.4 KG/M2

## 2024-06-11 DIAGNOSIS — R60.0 BILATERAL LEG EDEMA: ICD-10-CM

## 2024-06-11 LAB
AORTIC ARCH: 2 CM
ASCENDING AORTA: 2.7 CM
BH CV ECHO MEAS - ACS: 1.64 CM
BH CV ECHO MEAS - AO MAX PG: 13.7 MMHG
BH CV ECHO MEAS - AO MEAN PG: 7 MMHG
BH CV ECHO MEAS - AO ROOT DIAM: 3 CM
BH CV ECHO MEAS - AO V2 MAX: 185 CM/SEC
BH CV ECHO MEAS - AO V2 VTI: 45.6 CM
BH CV ECHO MEAS - AVA(I,D): 1.87 CM2
BH CV ECHO MEAS - EDV(CUBED): 110.6 ML
BH CV ECHO MEAS - EDV(MOD-SP2): 108 ML
BH CV ECHO MEAS - EDV(MOD-SP4): 84 ML
BH CV ECHO MEAS - EF(MOD-BP): 69.2 %
BH CV ECHO MEAS - EF(MOD-SP2): 69.4 %
BH CV ECHO MEAS - EF(MOD-SP4): 65.5 %
BH CV ECHO MEAS - ESV(CUBED): 16 ML
BH CV ECHO MEAS - ESV(MOD-SP2): 33 ML
BH CV ECHO MEAS - ESV(MOD-SP4): 29 ML
BH CV ECHO MEAS - FS: 47.5 %
BH CV ECHO MEAS - IVS/LVPW: 1.1 CM
BH CV ECHO MEAS - IVSD: 1.1 CM
BH CV ECHO MEAS - LAT PEAK E' VEL: 7.8 CM/SEC
BH CV ECHO MEAS - LV DIASTOLIC VOL/BSA (35-75): 49.2 CM2
BH CV ECHO MEAS - LV MASS(C)D: 181.9 GRAMS
BH CV ECHO MEAS - LV MAX PG: 5.6 MMHG
BH CV ECHO MEAS - LV MEAN PG: 3 MMHG
BH CV ECHO MEAS - LV SYSTOLIC VOL/BSA (12-30): 17 CM2
BH CV ECHO MEAS - LV V1 MAX: 118 CM/SEC
BH CV ECHO MEAS - LV V1 VTI: 30.3 CM
BH CV ECHO MEAS - LVIDD: 4.8 CM
BH CV ECHO MEAS - LVIDS: 2.5 CM
BH CV ECHO MEAS - LVOT AREA: 2.8 CM2
BH CV ECHO MEAS - LVOT DIAM: 1.89 CM
BH CV ECHO MEAS - LVPWD: 1 CM
BH CV ECHO MEAS - MED PEAK E' VEL: 5.8 CM/SEC
BH CV ECHO MEAS - MR MAX PG: 55.3 MMHG
BH CV ECHO MEAS - MR MAX VEL: 371.9 CM/SEC
BH CV ECHO MEAS - MV A DUR: 0.11 SEC
BH CV ECHO MEAS - MV A MAX VEL: 100 CM/SEC
BH CV ECHO MEAS - MV DEC SLOPE: 276.5 CM/SEC2
BH CV ECHO MEAS - MV DEC TIME: 0.2 SEC
BH CV ECHO MEAS - MV E MAX VEL: 106 CM/SEC
BH CV ECHO MEAS - MV E/A: 1.06
BH CV ECHO MEAS - MV MAX PG: 4.6 MMHG
BH CV ECHO MEAS - MV MEAN PG: 2.6 MMHG
BH CV ECHO MEAS - MV P1/2T: 113.6 MSEC
BH CV ECHO MEAS - MV V2 VTI: 38.2 CM
BH CV ECHO MEAS - MVA(P1/2T): 1.94 CM2
BH CV ECHO MEAS - MVA(VTI): 2.23 CM2
BH CV ECHO MEAS - PA ACC TIME: 0.15 SEC
BH CV ECHO MEAS - PA V2 MAX: 141.2 CM/SEC
BH CV ECHO MEAS - PULM A REVS DUR: 0.15 SEC
BH CV ECHO MEAS - PULM A REVS VEL: 23.1 CM/SEC
BH CV ECHO MEAS - PULM DIAS VEL: 54.6 CM/SEC
BH CV ECHO MEAS - PULM S/D: 1.05
BH CV ECHO MEAS - PULM SYS VEL: 57.2 CM/SEC
BH CV ECHO MEAS - QP/QS: 0.8
BH CV ECHO MEAS - RAP SYSTOLE: 3 MMHG
BH CV ECHO MEAS - RV MAX PG: 4.7 MMHG
BH CV ECHO MEAS - RV V1 MAX: 108.2 CM/SEC
BH CV ECHO MEAS - RV V1 VTI: 22.5 CM
BH CV ECHO MEAS - RVOT DIAM: 1.96 CM
BH CV ECHO MEAS - RVSP: 30.2 MMHG
BH CV ECHO MEAS - SUP REN AO DIAM: 2 CM
BH CV ECHO MEAS - SV(LVOT): 85.2 ML
BH CV ECHO MEAS - SV(MOD-SP2): 75 ML
BH CV ECHO MEAS - SV(MOD-SP4): 55 ML
BH CV ECHO MEAS - SV(RVOT): 68.1 ML
BH CV ECHO MEAS - SVI(LVOT): 49.9 ML/M2
BH CV ECHO MEAS - SVI(MOD-SP2): 43.9 ML/M2
BH CV ECHO MEAS - SVI(MOD-SP4): 32.2 ML/M2
BH CV ECHO MEAS - TAPSE (>1.6): 2.22 CM
BH CV ECHO MEAS - TR MAX PG: 27.2 MMHG
BH CV ECHO MEAS - TR MAX VEL: 260.6 CM/SEC
BH CV ECHO MEASUREMENTS AVERAGE E/E' RATIO: 15.59
BH CV XLRA - RV BASE: 2.6 CM
BH CV XLRA - RV LENGTH: 7 CM
BH CV XLRA - RV MID: 3.2 CM
BH CV XLRA - TDI S': 12.8 CM/SEC
LEFT ATRIUM VOLUME INDEX: 38 ML/M2
SINUS: 2.45 CM
STJ: 2.45 CM

## 2024-06-11 PROCEDURE — 93306 TTE W/DOPPLER COMPLETE: CPT | Performed by: INTERNAL MEDICINE

## 2024-06-11 PROCEDURE — 93306 TTE W/DOPPLER COMPLETE: CPT

## 2024-06-11 NOTE — TELEPHONE ENCOUNTER
I called Sheela Soto at [phone] at 17:25 EDT     There was no answer so I left office call back number.

## 2024-06-12 ENCOUNTER — TELEPHONE (OUTPATIENT)
Dept: INTERNAL MEDICINE | Facility: CLINIC | Age: 79
End: 2024-06-12
Payer: MEDICARE

## 2024-06-12 DIAGNOSIS — I51.89 DIASTOLIC DYSFUNCTION: ICD-10-CM

## 2024-06-12 DIAGNOSIS — R60.0 BILATERAL LEG EDEMA: Primary | ICD-10-CM

## 2024-06-12 NOTE — TELEPHONE ENCOUNTER
I called Sheela Soto at [phone] at 17:18 EDT     Discussed echo results. Suspect leg edema due to past varicose vein surgery and knee surgery. She would like a referral to see a cardiology.

## 2024-06-12 NOTE — TELEPHONE ENCOUNTER
Caller: Sheela Soto    Relationship: Self    Best call back number: [phone]    What was the call regarding: PATIENT RETURNING CALL TO DR WEBSTER REGARDING ECHO RESULTS.    PLEASE CALL.

## 2024-06-13 DIAGNOSIS — G25.81 RESTLESS LEG SYNDROME: ICD-10-CM

## 2024-06-13 DIAGNOSIS — R60.0 BILATERAL LEG EDEMA: ICD-10-CM

## 2024-06-13 RX ORDER — FUROSEMIDE 40 MG/1
40 TABLET ORAL DAILY
Qty: 30 TABLET | Refills: 0 | Status: SHIPPED | OUTPATIENT
Start: 2024-06-13

## 2024-06-13 RX ORDER — ROPINIROLE 1 MG/1
1 TABLET, FILM COATED ORAL 2 TIMES DAILY
Qty: 30 TABLET | Refills: 0 | Status: SHIPPED | OUTPATIENT
Start: 2024-06-13

## 2024-06-13 NOTE — TELEPHONE ENCOUNTER
Caller: Sheela Soto    Relationship: Self    Best call back number: [phone]    Requested Prescriptions:   Requested Prescriptions     Pending Prescriptions Disp Refills    rOPINIRole (REQUIP) 1 MG tablet 30 tablet 0     Sig: Take 1 tablet by mouth 2 (Two) Times a Day.    furosemide (Lasix) 40 MG tablet 30 tablet 0     Sig: Take 1 tablet by mouth Daily.        Pharmacy where request should be sent: [facility] - [place], [place] - [address] - [phone]  - [phone] FX     Last office visit with prescribing clinician: 5/23/2024   Last telemedicine visit with prescribing clinician: Visit date not found   Next office visit with prescribing clinician: 6/25/2024     Additional details provided by patient:     Does the patient have less than a 3 day supply:  [x] Yes  [] No    Would you like a call back once the refill request has been completed: [x] Yes [] No    If the office needs to give you a call back, can they leave a voicemail: [x] Yes [] No    Aarti Alcaraz   06/13/24 11:28 EDT

## 2024-06-25 ENCOUNTER — OFFICE VISIT (OUTPATIENT)
Dept: INTERNAL MEDICINE | Facility: CLINIC | Age: 79
End: 2024-06-25
Payer: MEDICARE

## 2024-06-25 VITALS
DIASTOLIC BLOOD PRESSURE: 85 MMHG | SYSTOLIC BLOOD PRESSURE: 135 MMHG | HEART RATE: 70 BPM | HEIGHT: 69 IN | OXYGEN SATURATION: 97 % | WEIGHT: 153.4 LBS | BODY MASS INDEX: 22.72 KG/M2

## 2024-06-25 DIAGNOSIS — I10 PRIMARY HYPERTENSION: Primary | ICD-10-CM

## 2024-06-25 DIAGNOSIS — Z79.899 HIGH RISK MEDICATION USE: ICD-10-CM

## 2024-06-25 DIAGNOSIS — R60.0 BILATERAL LEG EDEMA: ICD-10-CM

## 2024-06-25 DIAGNOSIS — G25.81 RESTLESS LEG SYNDROME: ICD-10-CM

## 2024-06-25 LAB
BUN SERPL-MCNC: 16 MG/DL (ref 8–23)
BUN/CREAT SERPL: 24.6 (ref 7–25)
CALCIUM SERPL-MCNC: 9.5 MG/DL (ref 8.6–10.5)
CHLORIDE SERPL-SCNC: 101 MMOL/L (ref 98–107)
CO2 SERPL-SCNC: 27.5 MMOL/L (ref 22–29)
CREAT SERPL-MCNC: 0.65 MG/DL (ref 0.57–1)
EGFRCR SERPLBLD CKD-EPI 2021: 89.1 ML/MIN/1.73
GLUCOSE SERPL-MCNC: 95 MG/DL (ref 65–99)
POTASSIUM SERPL-SCNC: 4.1 MMOL/L (ref 3.5–5.2)
SODIUM SERPL-SCNC: 139 MMOL/L (ref 136–145)

## 2024-06-25 PROCEDURE — 99213 OFFICE O/P EST LOW 20 MIN: CPT | Performed by: STUDENT IN AN ORGANIZED HEALTH CARE EDUCATION/TRAINING PROGRAM

## 2024-06-25 PROCEDURE — 3078F DIAST BP <80 MM HG: CPT | Performed by: STUDENT IN AN ORGANIZED HEALTH CARE EDUCATION/TRAINING PROGRAM

## 2024-06-25 PROCEDURE — 3075F SYST BP GE 130 - 139MM HG: CPT | Performed by: STUDENT IN AN ORGANIZED HEALTH CARE EDUCATION/TRAINING PROGRAM

## 2024-06-25 NOTE — PROGRESS NOTES
Setfan Valencia M.D.  Internal Medicine  [facility]  [address], [address]  [place] [address]  [phone]      Chief Complaint  Hypertension (F/U /)    SUBJECTIVE    History of Present Illness    Sheela Soot is a 79 y.o. female who presents to the office today as an established patient that last saw me on 5/23/2024.     Leg edema-suspected to be secondary to chronic venous stasis and bilateral knee replacements.  She wears compression stockings daily.  At last appointment she was switched from hydrochlorothiazide to Lasix.  Losartan was decreased.      At last appointment her ropinirole was increased to twice daily.    She is concerned about right leg discomfort for 1 year and swollen right leg, ankle and foot.  She reports polyuria with diuretic.  Her leg remains edematous despite this.  She has an appointment with her cardiologist.  She has a history of varicose veins.  She had surgery to remove varicose veins. Wears compression stockings all the time.     She had an echo that showed a normal EF, mild concentric hypertrophy, severe left atrial dilation    She has an appointment with Dr. Hassan tomorrow. Patient very concerned about heart issue.    RLS is well controlled with increased dose of ropinirole.     States diastolic BP in 50s at home. Systolic BP in 120s.     Review of Systems    Allergies   Allergen Reactions    Codeine Nausea Only    Latex Rash     Doesn't like to wear latex gloves          Outpatient Medications Marked as Taking for the 6/25/24 encounter (Office Visit) with Stefan Valencia MD   Medication Sig Dispense Refill    atorvastatin (LIPITOR) 20 MG tablet Take 1 tablet by mouth Daily. 90 tablet 2    Cholecalciferol (Vitamin D3) 75 MCG (3000 UT) tablet Take  by mouth.      estradiol (ESTRACE) 0.1 MG/GM vaginal cream Insert 2 g into the vagina Daily. 42.5 g 2    ferrous gluconate (FERGON) 324 MG tablet Take 1 tablet by mouth Every Other Day. 45 tablet 2     furosemide (Lasix) 40 MG tablet Take 1 tablet by mouth Daily. 30 tablet 0    levothyroxine (SYNTHROID, LEVOTHROID) 112 MCG tablet Take 1 tablet by mouth Daily. 90 tablet 2    losartan (Cozaar) 25 MG tablet Take 1 tablet by mouth Daily. 90 tablet 2    Omeprazole 20 MG Tablet Delayed Release Dispersible   Oral, Daily, 0 Refill(s)      rOPINIRole (REQUIP) 1 MG tablet Take 1 tablet by mouth 2 (Two) Times a Day. 30 tablet 0    sertraline (ZOLOFT) 100 MG tablet Take 1 tablet by mouth Daily. 90 tablet 2    Zinc Sulfate 220 (50 Zn) MG tablet Take  by mouth.          Past Medical History:   Diagnosis Date    Anemia 3/30/2023    Had never been told this before…  put on iron prescription every other  day.day,    Anxiety , son killed in a motorcycle accident, put on zoloft.    My Son , age 27    Arthritis     This was noticeable in knees , had both knees replaced,     Cataract Years ago, surgeries       Kept watching them, had both eye surgeries. August and 2023.    Depression  my  27 year old son .    Zoloft. Helps me stay calm and not sad.    GERD (gastroesophageal reflux disease) 2006    20 mg Omeprazole  just 10 mg’s daily works perfect    History of medical problems     Sleep Apnea. Use machine nightly    Hyperlipidemia     Hypertension     Hypothyroidism     Neuromuscular disorder Around     Restless Legs, both, on Prescription OpiniRole,  helps some…    Sleep apnea     Urinary tract infection     UTI Dr. Laura Fuller, on Estradiol cream, 0.015%    Visual impairment Age 8,      Wear readers now only.  Had both eyes cataract surgeries .     Past Surgical History:   Procedure Laterality Date    ADENOIDECTOMY      Adnoids? & tonsils as an 7 yr. Old    COLONOSCOPY  2005    Only once no problems    EYE SURGERY  August & 2023    Cataract surgeries both eyes 2 weeks apart.    HYSTERECTOMY      JOINT REPLACEMENT  2007 toes.    Knees    Both knees replaced, toes  "operated on,    REPLACEMENT TOTAL KNEE BILATERAL      TONSILLECTOMY  1952    As an 7 year old child.  1952    TUBAL ABDOMINAL LIGATION  1988    No problems     Family History   Problem Relation Age of Onset    Pneumonia Mother     Depression Mother         Depression runs in my family on both sides.    Hyperlipidemia Mother     Hypertension Mother         All five of us, me and 4 siblings have  high blood pressure.    Miscarriages / Stillbirths Mother         She had miscarriages.    Vision loss Mother         Had Glaucoma later in life,  late 60’s.    No Known Problems Brother     Autism Other     Hypertension Sister         Age 77    Kidney disease Sister         Under a Kidney Specialist.    Hypertension Brother         Age 72    Hypertension Brother         Age 70    Hypertension Brother         Age 66    reports that she has never smoked. She has never used smokeless tobacco. She reports current alcohol use of about 3.0 standard drinks of alcohol per week. She reports that she does not use drugs.    OBJECTIVE    Vital Signs:   /85   Pulse 70   Ht 175.3 cm (69.02\")   Wt 69.6 kg (153 lb 6.4 oz)   SpO2 97%   BMI 22.64 kg/m²     Physical Exam  Constitutional:       Appearance: Normal appearance. She is normal weight.   HENT:      Right Ear: Tympanic membrane normal.      Left Ear: Tympanic membrane normal.   Cardiovascular:      Rate and Rhythm: Normal rate and regular rhythm.      Heart sounds: Normal heart sounds. No murmur heard.  Pulmonary:      Effort: Pulmonary effort is normal.      Breath sounds: Normal breath sounds.   Musculoskeletal:      Right lower leg: Edema present.      Left lower leg: Edema present.   Skin:     General: Skin is warm and dry.   Neurological:      Mental Status: She is alert.   Psychiatric:         Mood and Affect: Mood normal.         Behavior: Behavior normal.         Thought Content: Thought content normal.            The following data was reviewed by: Stefan Valencia MD " on 06/25/2024:  CMP          3/29/2024    13:50 4/29/2024    14:16 5/23/2024    10:53   CMP   Glucose 120  86  89    BUN 18  18  10    Creatinine 0.56  0.67  0.58    Sodium 137  138  137    Potassium 4.1  3.9  4.2    Chloride 102  100  99    Calcium 9.2  9.4  9.0    BUN/Creatinine Ratio 32  27  17.2      CBC w/diff          10/4/2023    11:33   CBC w/Diff   WBC 5.13    RBC 3.99    Hemoglobin 12.6    Hematocrit 36.9    MCV 92.5    MCH 31.6    MCHC 34.1    RDW 12.1    Platelets 237    Neutrophil Rel % 56.0    Lymphocyte Rel % 33.9    Monocyte Rel % 6.8    Eosinophil Rel % 2.1    Basophil Rel % 0.8      Lipid Panel          10/4/2023    11:33 5/23/2024    10:53   Lipid Panel   Total Cholesterol 174  264    Triglycerides 70  90    HDL Cholesterol 87  94    VLDL Cholesterol 13  15    LDL Cholesterol  74  155    LDL/HDL Ratio  1.62      TSH          10/4/2023    11:33   TSH   TSH 0.490        Data reviewed : Cardiology studies echo          Adult Transthoracic Echo Complete W/ Cont if Necessary Per Protocol (06/11/2024 14:27)     ASSESSMENT & PLAN     Diagnoses and all orders for this visit:    1. Primary hypertension (Primary)  -     Basic Metabolic Panel    2. Bilateral leg edema  -     Ambulatory Referral to Lymphedema Clinic    3. Restless leg syndrome    4. High risk medication use  -     Basic Metabolic Panel    Edema is unchanged despite treatment with 40 mg of Lasix daily.  Edema is uncomfortable for her but otherwise not causing issues.  Will refer to lymphedema clinic for treatment. Consider lower extremity ultrasound if further work up warranted but I suspect this is chronic venous stasis.  She is seeing cardiology tomorrow.    BP elevated in recent office visits. BP controlled at home per patient. Continue current management. Patient was asked to check their BP 3-5 times weekly at various times of day after being seated for 5 minutes or longer. Asked patient to bring log to next visit.     RLS better with up  "titration of ropinirole            Health Maintenance Due   Topic Date Due    RSV Vaccine - Adults (1 - 1-dose 60+ series) Never done    COVID-19 Vaccine (5 - 2023-24 season) 09/01/2023        Follow Up  Return in about 4 weeks (around 7/23/2024) for Recheck.    Patient/family had no further questions at this time and verbalized understanding of the plan discussed today.     Answers submitted by the patient for this visit:  Other (Submitted on 6/24/2024)  Please describe your symptoms.: For almost a year, I have had right leg discomfort.  I have Restless Leg Syndrome at night, and also a swollen right leg, ankle, and foot.  I take prescriptions for both, they help \"some\"... the water pill does make me urinate offten, but the swelling does not go down, my leg and foot hurt just sitting with it elevated.  The left leg is somewhat swollen.  I have an appointment on Wednesday, 26th, also, with a [facility] Cardiologist,  Dr. Prabhakar, to be sure it is not heart related.  Have you had these symptoms before?: Yes  How long have you been having these symptoms?: Greater than 2 weeks  Please list any medications you are currently taking for this condition.: For Swelling:   Furosemide 40 mg tablet,          and, For Restless leg Syndrome:  Ropinirole Hcl 1 mg tablet, From:  [facility]  [phone], Dr.:  Dr. Stefan Valencia, [facility]  Please describe any probable cause for these symptoms. : -  I have had both knees replaced in 2014, 6 months apart.,  - After several miscarriages, still-birth, my children were adopted, but from all the pregnancies, I had blue varicose veins that were huge, and hurt.  I had two surgeries several years apart to remove them.  One was in the 1970's and one was in the 1990's.   I will be 80 in January, 2025, so maybe , I have poor circulation from the knees down, so has been discussed with the the other Doctor, when Dr. Valencia was off for her pregnancy.  I have no pain nor " discomfort, nor swelling in my legs from the knees up...  Primary Reason for Visit (Submitted on 6/24/2024)  What is the primary reason for your visit?: Other

## 2024-06-26 ENCOUNTER — OFFICE VISIT (OUTPATIENT)
Dept: CARDIOLOGY | Facility: CLINIC | Age: 79
End: 2024-06-26
Payer: MEDICARE

## 2024-06-26 VITALS
HEART RATE: 64 BPM | WEIGHT: 155 LBS | DIASTOLIC BLOOD PRESSURE: 86 MMHG | HEIGHT: 66 IN | SYSTOLIC BLOOD PRESSURE: 142 MMHG | BODY MASS INDEX: 24.91 KG/M2

## 2024-06-26 DIAGNOSIS — R09.89 BILATERAL CAROTID BRUITS: ICD-10-CM

## 2024-06-26 DIAGNOSIS — I10 PRIMARY HYPERTENSION: ICD-10-CM

## 2024-06-26 DIAGNOSIS — R60.0 BILATERAL LEG EDEMA: Primary | ICD-10-CM

## 2024-06-26 DIAGNOSIS — E78.2 MIXED HYPERLIPIDEMIA: ICD-10-CM

## 2024-06-26 DIAGNOSIS — I51.89 DIASTOLIC DYSFUNCTION: ICD-10-CM

## 2024-06-26 PROCEDURE — 3079F DIAST BP 80-89 MM HG: CPT | Performed by: INTERNAL MEDICINE

## 2024-06-26 PROCEDURE — 93000 ELECTROCARDIOGRAM COMPLETE: CPT | Performed by: INTERNAL MEDICINE

## 2024-06-26 PROCEDURE — 99204 OFFICE O/P NEW MOD 45 MIN: CPT | Performed by: INTERNAL MEDICINE

## 2024-06-26 PROCEDURE — 1160F RVW MEDS BY RX/DR IN RCRD: CPT | Performed by: INTERNAL MEDICINE

## 2024-06-26 PROCEDURE — 1159F MED LIST DOCD IN RCRD: CPT | Performed by: INTERNAL MEDICINE

## 2024-06-26 PROCEDURE — 3077F SYST BP >= 140 MM HG: CPT | Performed by: INTERNAL MEDICINE

## 2024-06-26 RX ORDER — LOSARTAN POTASSIUM 50 MG/1
50 TABLET ORAL NIGHTLY
Qty: 90 TABLET | Refills: 3 | Status: SHIPPED | OUTPATIENT
Start: 2024-06-26

## 2024-06-26 NOTE — PROGRESS NOTES
Date of Office Visit: 2024  Encounter Provider: Ginny Abreu MD  Place of Service: [facility] CARDIOLOGY  Patient Name: Sheela Soto  :1945      Patient ID:  Sheela Soto is a 79 y.o. female is here for leg edema and diastolic dysfunction.          History of Present Illness    She has a history of hypertension, hyperlipidemia, GERD, thyroid disease, obstructive sleep apnea, anemia.  She is here for right leg edema    Her mom had hyperlipidemia, TIA and hypertension.  Her sister has hyperlipidemia and hypertension.  Her brother has hyperlipidemia and 3 of her brothers have hypertension.  She is , has 2 children 1 who is still alive and a son who  in a motor vehicle accident.  She is retired, has never smoked, has 3 red wine's per week and 2 cups of coffee per day.    Labs on 2024 show normal BMP.  Labs on 2024 show normal BMP, total cholesterol 264, HDL 94, , triglycerides 90, VLDL 15.  Labs in 10/4/2023 show normal CBC, normal TSH, normal iron studies, normal CMP.  Echocardiogram done 2024 shows ejection fraction 69% with mild concentric left ventricle hypertrophy, moderate to severe left atrial enlargement, grade 2 diastolic dysfunction, aortic valve calcification without stenosis, trace mitral and tricuspid insufficiency.  ABIs done 2023 were normal.    She has had several months of right lower extremity edema.  She had venous varicosities with vein stripping bilaterally .  She had this done again in the .  She had bilateral knee replacement in .  She then started noticing increasing lower extremity edema, right greater than left which started about 6 months ago.  She has had no fevers or chills.  She has no cough.  She has no difficulty breathing.  She has no chest pain or pressure.  She has no heart racing or skipping.  Her losartan was recently decreased because she was having fatigue-she was taking her  losartan at different times during the day.  Her blood pressure here today is high.  She does check it at home but did not bring her cuff and to check it for accuracy.  She has no orthopnea or PND.    Past Medical History:   Diagnosis Date    Anemia 3/30/2023    Had never been told this before…  put on iron prescription every other  day.day,    Anxiety , son killed in a motorcycle accident, put on zoloft.    My Son , age 27    Arthritis     This was noticeable in knees , had both knees replaced,     Cataract Years ago, surgeries       Kept watching them, had both eye surgeries. August and 2023.    Depression  my  27 year old son .    Zoloft. Helps me stay calm and not sad.    GERD (gastroesophageal reflux disease)     20 mg Omeprazole  just 10 mg’s daily works perfect    History of medical problems     Sleep Apnea. Use machine nightly    Hyperlipidemia     Hypertension     Hypothyroidism     Neuromuscular disorder Around     Restless Legs, both, on Prescription OpiniRole,  helps some…    Sleep apnea     Urinary tract infection     UTI Dr. Laura Fuller, on Estradiol cream, 0.015%    Visual impairment Age 8,      Wear readers now only.  Had both eyes cataract surgeries .         Past Surgical History:   Procedure Laterality Date    ADENOIDECTOMY      Adnoids? & tonsils as an 7 yr. Old    COLONOSCOPY  2005    Only once no problems    EYE SURGERY  August & 2023    Cataract surgeries both eyes 2 weeks apart.    HYSTERECTOMY      JOINT REPLACEMENT  2007 toes.    Knees    Both knees replaced, toes operated on,    REPLACEMENT TOTAL KNEE BILATERAL      TONSILLECTOMY      As an 7 year old child.      TUBAL ABDOMINAL LIGATION  1988    No problems       Current Outpatient Medications on File Prior to Visit   Medication Sig Dispense Refill    atorvastatin (LIPITOR) 20 MG tablet Take 1 tablet by mouth Daily. 90 tablet 2    Cholecalciferol (Vitamin D3) 75  MCG (3000 UT) tablet Take  by mouth.      estradiol (ESTRACE) 0.1 MG/GM vaginal cream Insert 2 g into the vagina Daily. 42.5 g 2    furosemide (Lasix) 40 MG tablet Take 1 tablet by mouth Daily. 30 tablet 0    levothyroxine (SYNTHROID, LEVOTHROID) 112 MCG tablet Take 1 tablet by mouth Daily. 90 tablet 2    Omeprazole 20 MG Tablet Delayed Release Dispersible   Oral, Daily, 0 Refill(s)      rOPINIRole (REQUIP) 1 MG tablet Take 1 tablet by mouth 2 (Two) Times a Day. 30 tablet 0    sertraline (ZOLOFT) 100 MG tablet Take 1 tablet by mouth Daily. 90 tablet 2    Zinc Sulfate 220 (50 Zn) MG tablet Take  by mouth.      [DISCONTINUED] losartan (Cozaar) 25 MG tablet Take 1 tablet by mouth Daily. 90 tablet 2    [DISCONTINUED] ferrous gluconate (FERGON) 324 MG tablet Take 1 tablet by mouth Every Other Day. (Patient not taking: Reported on 6/26/2024) 45 tablet 2     No current facility-administered medications on file prior to visit.       Social History     Socioeconomic History    Marital status:     Number of children: 2   Tobacco Use    Smoking status: Never     Passive exposure: Never    Smokeless tobacco: Never    Tobacco comments:     Caffeine: 2-3 cups daily   Vaping Use    Vaping status: Never Used   Substance and Sexual Activity    Alcohol use: Yes     Alcohol/week: 3.0 standard drinks of alcohol     Types: 3 Glasses of wine per week     Comment: I drink “red” wine with dinner sometimes.    Drug use: Never    Sexual activity: Not Currently     Partners: Male     Birth control/protection: None, Tubal ligation, Hysterectomy             Procedures    ECG 12 Lead    Date/Time: 6/26/2024 10:09 AM  Performed by: Ginny Abreu MD    Authorized by: Ginny Abreu MD  Comparison: not compared with previous ECG   Previous ECG: no previous ECG available  Rhythm: sinus rhythm    Clinical impression: normal ECG              Objective:      Vitals:    06/26/24 0954   BP: 142/86   Pulse: 64   Weight: 70.3 kg (155  "lb)   Height: 167.6 cm (66\")     Body mass index is 25.02 kg/m².    Vitals reviewed.   Constitutional:       General: Not in acute distress.     Appearance: Not diaphoretic.   Neck:      Vascular: No carotid bruit or JVD.   Pulmonary:      Effort: Pulmonary effort is normal.      Breath sounds: Normal breath sounds.   Cardiovascular:      Normal rate. Regular rhythm.      Murmurs: There is a grade 2/6 midsystolic murmur at the URSB and ULSB.      No gallop.  No rub.   Pulses:     Carotid: 2+ with bruit bilaterally.     Radial: 2+ bilaterally.     Dorsalis pedis: 2+ bilaterally.     Posterior tibial: 2+ bilaterally.  Edema:     Peripheral edema present.     Pretibial: 2+ edema of the right pretibial area.     Ankle: 2+ edema of the right ankle.  Neurological:      Cranial Nerves: No cranial nerve deficit.         Lab Review:       Assessment:      Diagnosis Plan   1. Primary hypertension  losartan (Cozaar) 50 MG tablet    Ambulatory Referral to Lymphedema Clinic    Duplex Carotid Ultrasound CAR      2. Mixed hyperlipidemia  Ambulatory Referral to Lymphedema Clinic    Duplex Carotid Ultrasound CAR      3. Bilateral leg edema  US Venous Doppler Lower Extremity Bilateral (duplex)    Ambulatory Referral to Lymphedema Clinic      4. Bilateral carotid bruits  Duplex Carotid Ultrasound CAR      5. Diastolic dysfunction          Hypertension, goal <120/80.  She is not at goal, increase losartan to 50 mg and take nightly.  Leg edema, right greater than left, appears to be lymphedema, DP and PT pulses are intact, venous varicosities noted.  Set venous duplex study and refer to lymphedema clinic.  Lasix will likely not help this much.  Diastolic dysfunction  Hyperlipidemia, on atorvastatin  GERD  OMAYRA  Hypothyroidism, on levothyroxine.  Bilateral carotid bruits, set up carotid duplex study.     Plan:       See APRN in 1 year, set up testing as noted above.  Continue to monitor blood pressure at home.  "

## 2024-06-26 NOTE — PROGRESS NOTES
Please notify patient of results.   Kidney function and electrolytes appear to be normal.  Continue current medications.

## 2024-07-03 DIAGNOSIS — G25.81 RESTLESS LEG SYNDROME: ICD-10-CM

## 2024-07-03 RX ORDER — ROPINIROLE 1 MG/1
1 TABLET, FILM COATED ORAL 2 TIMES DAILY
Qty: 60 TABLET | Refills: 0 | Status: SHIPPED | OUTPATIENT
Start: 2024-07-03

## 2024-07-03 RX ORDER — ROPINIROLE 1 MG/1
1 TABLET, FILM COATED ORAL 2 TIMES DAILY
Qty: 60 TABLET | Refills: 0 | OUTPATIENT
Start: 2024-07-03

## 2024-07-03 NOTE — TELEPHONE ENCOUNTER
Caller: Sheela Soto    Relationship: Self    Best call back number:     [phone]       Requested Prescriptions:   Requested Prescriptions     Pending Prescriptions Disp Refills    rOPINIRole (REQUIP) 1 MG tablet 60 tablet 0     Sig: Take 1 tablet by mouth 2 (Two) Times a Day.        Pharmacy where request should be sent:        [facility] - [place], [place] - [address] - [phone]  - [phone] FX         Last office visit with prescribing clinician: 6/25/2024   Last telemedicine visit with prescribing clinician: Visit date not found   Next office visit with prescribing clinician: 7/24/2024     Additional details provided by patient:     OUT OF MEDICATION AND NEEDS HER REFILL ASAP    Does the patient have less than a 3 day supply:  [x] Yes  [] No    Would you like a call back once the refill request has been completed: [x] Yes [] No    If the office needs to give you a call back, can they leave a voicemail: [x] Yes [] No    Aarti Hewitt Rep   07/03/24 15:11 EDT

## 2024-07-09 DIAGNOSIS — I10 PRIMARY HYPERTENSION: ICD-10-CM

## 2024-07-09 RX ORDER — LOSARTAN POTASSIUM 50 MG/1
50 TABLET ORAL NIGHTLY
Qty: 90 TABLET | Refills: 3 | Status: SHIPPED | OUTPATIENT
Start: 2024-07-09

## 2024-07-10 ENCOUNTER — HOSPITAL ENCOUNTER (OUTPATIENT)
Dept: OCCUPATIONAL THERAPY | Facility: HOSPITAL | Age: 79
Setting detail: THERAPIES SERIES
Discharge: HOME | End: 2024-07-10
Payer: MEDICARE

## 2024-07-10 DIAGNOSIS — I89.0 LYMPHEDEMA, NOT ELSEWHERE CLASSIFIED: Primary | ICD-10-CM

## 2024-07-10 PROCEDURE — 97165 OT EVAL LOW COMPLEX 30 MIN: CPT

## 2024-07-10 NOTE — THERAPY EVALUATION
Outpatient Occupational Therapy Lymphedema Initial Evaluation  [facility]     Patient Name: Sheela Soto  : 1945  MRN: [id number]  Today's Date: 7/10/2024      Visit Date: 07/10/2024    Patient Active Problem List   Diagnosis    Colon polyps    Depression    GERD (gastroesophageal reflux disease)    Hyperlipidemia    Hypertension    Right knee DJD    Thyroid disorder    Restless leg syndrome    Diastolic dysfunction    Bilateral leg edema    Bilateral carotid bruits        Past Medical History:   Diagnosis Date    Anemia 3/30/2023    Had never been told this before…  put on iron prescription every other  day.day,    Anxiety , son killed in a motorcycle accident, put on zoloft.    My Son , age 27    Arthritis     This was noticeable in knees , had both knees replaced,     Cataract Years ago, surgeries       Kept watching them, had both eye surgeries. August and 2023.    Depression  my  27 year old son .    Zoloft. Helps me stay calm and not sad.    GERD (gastroesophageal reflux disease)     20 mg Omeprazole  just 10 mg’s daily works perfect    History of medical problems     Sleep Apnea. Use machine nightly    Hyperlipidemia     Hypertension     Hypothyroidism     Neuromuscular disorder Around     Restless Legs, both, on Prescription OpiniRole,  helps some…    Sleep apnea     Urinary tract infection     UTI Dr. Laura Fuller, on Estradiol cream, 0.015%    Visual impairment Age 8,      Wear readers now only.  Had both eyes cataract surgeries .        Past Surgical History:   Procedure Laterality Date    ADENOIDECTOMY      Adnoids? & tonsils as an 7 yr. Old    COLONOSCOPY  2005    Only once no problems    EYE SURGERY  August & 2023    Cataract surgeries both eyes 2 weeks apart.    HYSTERECTOMY      JOINT REPLACEMENT  2007 toes.    Knees    Both knees replaced, toes operated on,    REPLACEMENT TOTAL KNEE BILATERAL      TONSILLECTOMY       As an 7 year old child.  1952    TUBAL ABDOMINAL LIGATION  1988    No problems         Visit Dx:     ICD-10-CM ICD-9-CM   1. Lymphedema, not elsewhere classified  I89.0 457.1        Patient History       Row Name 07/10/24 1100             History    Chief Complaint Swelling  -CW      Date Current Problem(s) Began --  About 1 year ago.  -CW      Brief Description of Current Complaint Pt. reports her LE edema started about 1 year ago then noticed greater increased edema 2/24. HX. vein stripping 1970's, B TKR 2014, HTN,  sleep apnea, thyroid disease, anemia.  -CW      How has patient tried to help current problem? elevation, stockings, diuretics.  -CW         Fall Risk Assessment    Any falls in the past year: No  -CW         Services    Are you currently receiving Home Health services No  -CW         Daily Activities    Primary Language English  -CW      Are you able to read Yes  -CW      Are you able to write Yes  -CW      Teaching needs identified Management of Condition;Home Exercise Program  -CW      Patient is concerned about/has problems with Climbing Stairs;Walking  -CW      Explanation of Functional Status Problem Shoes do not fit properly at times. Difficulty walking, steps. Increased LE pain when driving.  -CW      Pt Participated in POC and Goals Yes  -CW                User Key  (r) = Recorded By, (t) = Taken By, (c) = Cosigned By      Initials Name Provider Type    Anjali Stone OTR Occupational Therapist                     Lymphedema       Row Name 07/10/24 1200             Subjective Pain    Able to rate subjective pain? yes  -CW      Pre-Treatment Pain Level 0  -CW      Post-Treatment Pain Level 0  -CW      Subjective Pain Comment No LE pain  at rest.  -CW         Subjective    Subjective Comments Pt. reports increased RLE pain, stinging at times 8/10 .  -CW         Lymphedema Assessment    Lymphedema Classification RLE:;LLE:;stage 1 (Spontaneously Reversible)  -CW      Lymphedema Assessment  "Comments RLE edema >LLE.  -CW         LLIS - Physical Concerns    The amount of pain associated with my lymphedema is: 3  -CW      The amount of limb heaviness associated with my lymphedema is: 3  -CW      The amount of skin tightness associated with my lymphedema is: 4  -CW      The size of my swollen limb(s) seems: 4  -CW      Lymphedema affects the movement of my swollen limb(s): 3  -CW      The strength in my swollen limb(s) is: 3  -CW         LLIS - Psychosocial Concerns    Lymphedema affects my body image (i.e., \"how I think I look\"). 4  -CW      Lymphedema affects my socializing with others. 0  -CW      Lymphedema affects my intimate relations with spouse or partner (rate 0 if not applicable 0  -CW      Lymphedema \"gets me down\" (i.e., depression, frustration, or anger) 4  -CW      I must rely on others for help due to my lymphedema. 1  -CW      I know what to do to manage my lymphedema 2  -CW         LLIS - Functional Concerns    Lymphedema affects my ability to perform self-care activities (i.e. eating, dressing, hygiene) 0  -CW      Lymphedema affects my ability to perform routine home or work-related activities. 2  -CW      Lymphedema affects my performance of preferred leisure activities. 4  -CW      Lymphedema affects proper fit of clothing/shoes 3  -CW      Lymphedema affects my sleep 2  -CW         Posture/Observations    Observations Edema  -CW         General ROM    GENERAL ROM COMMENTS AROM WFL's BLE's  -CW         MMT (Manual Muscle Testing)    General MMT Comments 4/5 general BLE's  -CW         Lymphedema Edema Assessment    Ptting Edema Category By grade out of 4  -CW      Pitting Edema + 2/4  -CW         Skin Changes/Observations    Location/Assessment Lower Extremity  -CW      Lower Extremity Conditions bilateral:;normal;intact;dry  -CW      Lower Extremity Color/Pigment bilateral:;normal  -CW         Lymphedema Sensation    Lymphedema Sensation Reports RLE:;LLE:;normal  -CW      Lymphedema " Sensation Comments Stinging noted R foot  -CW         Lymphedema Measurements    Measurement Type(s) Circumferential  -CW      Circumferential Areas Lower extremities  -CW         BLE Circumferential (cm)    Measurement Location 1 knee  -CW      Left 1 34.8 cm  -CW      Right 1 35 cm  -CW      Measurement Location 2 10 cm below knee  -CW      Left 2 35.3 cm  -CW      Right 2 36.5 cm  -CW      Measurement Location 3 20 cm below knee  -CW      Left 3 27.8 cm  -CW      Right 3 29.4 cm  -CW      Measurement Location 4 30 cm below knee  -CW      Left 4 23.2 cm  -CW      Right 4 25.1 cm  -CW      Measurement Location 5 ankle  -CW      Left 5 26.5 cm  -CW      Right 5 28.3 cm  -CW      Measurement Location 6 mid foot  -CW      Left 6 21 cm  -CW      Right 6 21.7 cm  -CW      LLE Circumferential Total 168.6 cm  -CW      RLE Circumferential Total 176 cm  -CW         Compression/Skin Care    Compression/Skin Care skin care;wrapping location;bandaging;compression garment;remove bandages  -CW         Lymphedema Life Impact Scale Totals    A.  Total Q1 - Q17 (Do not include Q18) 42  -CW      B.  Total number of questions answered (Q1-Q17) 17  -CW      C. Divide A by B 2.47  -CW      D. Multiple C by 25 61.75  -CW                User Key  (r) = Recorded By, (t) = Taken By, (c) = Cosigned By      Initials Name Provider Type    CW Anjali Mathis, OTR Occupational Therapist                            Therapy Education  Education Details: Discussed lymph. tx. program and poc. Reviewed long term edema management options. Issued handouts including Healthy habits for edema risk reduction.  Given: Edema management, Symptoms/condition management  Program: New  How Provided: Verbal  Provided to: Patient  Level of Understanding: Verbalized  30463 - OT Self Care/Mgmt Minutes: 5         OT Goals       Row Name 07/10/24 1200          OT Short Term Goals    STG Date to Achieve 07/24/24  -CW     STG 1 Patient to demonstrate proper awareness of  “What is Lymphedema” and DO’s and Don’ts” for improved prevention, management, care of symptoms, and ease of transition to self-care of condition.  -CW     STG 1 Progress New  -CW     STG 2 Patient independent and compliant with self-wrapping techniques of compression bandages with family member or caregiver as indicated for improved self-management of condition.  -CW     STG 2 Progress New  -CW     STG 3 Patient demonstrate decreased net edema of  BLE's >3-/5 cm. for decreased in edema, symptoms, decreased risk of infection, and improved skin-care/transition to self-care of condition.  -CW     STG 3 Progress New  -CW     STG 4 Patient independent and compliant with home exercise program (as able) focused on range of motion, flexibility, to improve lymphatic flow and discomfort.  -CW     STG 4 Progress New  -        Long Term Goals    LTG Date to Achieve 08/07/24  -CW     LTG 1 Patient will demonstrate decreased net edema of  BLE's >/=5-10 cm. for decrease in symptoms, decreased risk of infection, and improved skin-care/transition to self-care of condition.  -CW     LTG 1 Progress New  -CW     LTG 2 Patient/family/caregivers independent with self-care techniques for self-management of condition.  -CW     LTG 2 Progress New  -CW     LTG 3 Patient independent and compliant with use and care of compression (example garments, inelastic velcro compression) with assistance of a caregiver as needed to promote self-care independence.  -     LTG 3 Progress New  -        Time Calculation    OT Goal Re-Cert Due Date 10/02/24  -               User Key  (r) = Recorded By, (t) = Taken By, (c) = Cosigned By      Initials Name Provider Type    Anjali Stone OTR Occupational Therapist                     OT Assessment/Plan       Row Name 07/10/24 2080          OT Assessment    Functional Limitations Limitations in functional capacity and performance;Performance in self-care ADL;Performance in leisure  activities;Limitation in home management;Limitations in community activities  -CW     Impairments Edema;Impaired lymphatic circulation;Integumentary integrity;Impaired venous circulation  -CW     Assessment Comments Pt. presents 78 y/o female with increased edema BLE’s. Edema is 2+ feet to knees RLE>LLE. Pt. reports her edema started about 1 year ago and has recently gotten worse since 2/24. Hx vein stripping 1970's and 1990's, B TKR 2014. Pt. complains of no pain at rest, but has increased pain at times RLE with stinging 8/10 on pain scale . Total circumference .0 cm. and .6 cm. Skin is intact with no wounds or sores.     Pt. has compression stockings that she has tried at home to help symptoms. Functionally pt. reports her walking has difficult due to swelling.  Pt. would benefit from full Complete Decongestive Therapy (CDT) short term to decrease edema, decrease risk of infection, improve skin integrity, and to learn independent self-care edema management. For phase 1 decongestive phase of lymphedema tx. pt. needs tg9, Cellona 10 cm quantity 6, Rosidal 8cm quantity 4, Rosidal 10 cm quantity 4.  -CW     Please refer to paper survey for additional self-reported information Yes  -CW     OT Rehab Potential Good  -CW     Patient/caregiver participated in establishment of treatment plan and goals Yes  -CW     Patient would benefit from skilled therapy intervention Yes  -CW        OT Plan    OT Frequency 4x/week  -CW     Predicted Duration of Therapy Intervention (OT) short term 2-3 weeks  -CW     Planned CPT's? OT EVAL LOW COMPLEXITY: 48282;OT THER PROC EA 15 MIN: [id number];OT SELF CARE/MGMT/TRAIN 15 MIN: 20694;OT MANUAL THERAPY EA 15 MIN: 10092  -CW     Planned Therapy Interventions (Optional Details) patient/family education;home exercise program;manual therapy techniques  -CW     OT Plan Comments Plan to schedule pt. for tx.  -CW               User Key  (r) = Recorded By, (t) = Taken By, (c) = Cosigned  By      Initials Name Provider Type    CW Anjali Mathis OTR Occupational Therapist                              Time Calculation:   OT Start Time: 1042  OT Stop Time: 1139  OT Time Calculation (min): 57 min  Total Timed Code Minutes- OT: 5 minute(s)  Timed Charges  90609 - OT Self Care/Mgmt Minutes: 5  Total Minutes  Timed Charges Total Minutes: 5   Total Minutes: 5     Therapy Charges for Today       Code Description Service Date Service Provider Modifiers Qty    [id number] HC OT EVAL LOW COMPLEXITY 4 7/10/2024 Anjali Mathis OTR GO 1                      REINALDO Marcelino  7/10/2024

## 2024-07-15 ENCOUNTER — TELEPHONE (OUTPATIENT)
Dept: CARDIOLOGY | Facility: CLINIC | Age: 79
End: 2024-07-15
Payer: MEDICARE

## 2024-07-15 NOTE — TELEPHONE ENCOUNTER
Patient called and requested to get the Dopplar Scheduled.  Please advise.    CB: [phone]    Sheron

## 2024-07-17 ENCOUNTER — HOSPITAL ENCOUNTER (OUTPATIENT)
Dept: OCCUPATIONAL THERAPY | Facility: HOSPITAL | Age: 79
Setting detail: THERAPIES SERIES
Discharge: HOME | End: 2024-07-17
Payer: MEDICARE

## 2024-07-17 DIAGNOSIS — I89.0 LYMPHEDEMA, NOT ELSEWHERE CLASSIFIED: Primary | ICD-10-CM

## 2024-07-17 PROCEDURE — 97140 MANUAL THERAPY 1/> REGIONS: CPT

## 2024-07-17 PROCEDURE — 97535 SELF CARE MNGMENT TRAINING: CPT

## 2024-07-17 NOTE — THERAPY TREATMENT NOTE
Outpatient Occupational Therapy Lymphedema Treatment Note  [facility]     Patient Name: Sheela Soto  : 1945  MRN: [id number]  Today's Date: 2024      Visit Date: 2024    Patient Active Problem List   Diagnosis    Colon polyps    Depression    GERD (gastroesophageal reflux disease)    Hyperlipidemia    Hypertension    Right knee DJD    Thyroid disorder    Restless leg syndrome    Diastolic dysfunction    Bilateral leg edema    Bilateral carotid bruits        Past Medical History:   Diagnosis Date    Anemia 3/30/2023    Had never been told this before…  put on iron prescription every other  day.day,    Anxiety , son killed in a motorcycle accident, put on zoloft.    My Son , age 27    Arthritis     This was noticeable in knees , had both knees replaced,     Cataract Years ago, surgeries       Kept watching them, had both eye surgeries. August and 2023.    Depression  my  27 year old son .    Zoloft. Helps me stay calm and not sad.    GERD (gastroesophageal reflux disease)     20 mg Omeprazole  just 10 mg’s daily works perfect    History of medical problems     Sleep Apnea. Use machine nightly    Hyperlipidemia     Hypertension     Hypothyroidism     Neuromuscular disorder Around     Restless Legs, both, on Prescription OpiniRole,  helps some…    Sleep apnea     Urinary tract infection     UTI Dr. Laura Fuller, on Estradiol cream, 0.015%    Visual impairment Age 8,      Wear readers now only.  Had both eyes cataract surgeries .        Past Surgical History:   Procedure Laterality Date    ADENOIDECTOMY      Adnoids? & tonsils as an 7 yr. Old    COLONOSCOPY  2005    Only once no problems    EYE SURGERY  August & 2023    Cataract surgeries both eyes 2 weeks apart.    HYSTERECTOMY      JOINT REPLACEMENT  2007 toes.    Knees    Both knees replaced, toes operated on,    REPLACEMENT TOTAL KNEE BILATERAL      TONSILLECTOMY      As  an 7 year old child.  1952    TUBAL ABDOMINAL LIGATION  1988    No problems         Visit Dx:      ICD-10-CM ICD-9-CM   1. Lymphedema, not elsewhere classified  I89.0 457.1        Lymphedema       Row Name 07/17/24 0700             Subjective Pain    Able to rate subjective pain? yes  -CW      Pre-Treatment Pain Level 0  -CW      Post-Treatment Pain Level 0  -CW         Subjective    Subjective Comments At times has discomfort R in step.  -CW         Skin Changes/Observations    Location/Assessment Lower Extremity  -CW      Lower Extremity Conditions bilateral:;normal;intact;dry  -CW      Lower Extremity Color/Pigment bilateral:;normal  -CW         Manual Lymphatic Drainage    Manual Lymphatic Drainage initial sequence;opened regional lymph nodes;opened anastamoses;extremity treatment  -CW      Initial Sequence short neck;abdomen  -CW      Abdomen superficial  -CW      Opened Regional Lymph Nodes axillary;inguinal  -CW      Axillary right;left  -CW      Inguinal right;left  -CW      Opened Anastamoses inguino-axillary  -CW      Inguino-Axillary right;left  -CW      Extremity Treatment MLD to full limb  -CW         Compression/Skin Care    Compression/Skin Care skin care;wrapping location;bandaging;compression garment;remove bandages  -CW      Wrapping Location lower extremity  -CW      Wrapping Location LE foot to knee  -CW      Wrapping Comments Tg9, 1- 10 cm 1- 15 cm. Artiflex. 1- 8cm, 1- 10 cm Rosidal.  -CW      Bandage Layers cotton liner;padding/fluff layer;short-stretch bandages (comment size/quantity)  -CW      Bandaging Technique light compression  -CW      Compression Garment Comments Tried on pt.'s stocking from home and checked fit.  -CW                User Key  (r) = Recorded By, (t) = Taken By, (c) = Cosigned By      Initials Name Provider Type    CW Anjali Mathis OTR Occupational Therapist                             OT Assessment/Plan       Row Name 07/17/24 0901          OT Assessment    Assessment  Comments Pt. reports mild pain R in step at times. Skin intact with no rashes. MLD completed with pt. supine. Instructed how to complete self MLD sequence. Reviewed with pt. how to apply the compression bandages step by step. Ed. completed regarding wearing schedule for compression garments, skin care, HEP, self MLD, bandage precautions/wearing schedule. Checked overall fit of pt.'s compression knee highs from home. Pt. is not able to attend tx. consistently. We will schedule pt. for follow up. For phase 2 lymphedema maintenance phase of tx. pt. will need: Circaid Juxtafit Basic lower leg medium quantity 2, compressive under sock 25-35 mmHg quantity 2 pairs. size standard.  -CW        OT Plan    OT Plan Comments Pt. can come Fridays. Will schedule pt. for follow up.  -CW               User Key  (r) = Recorded By, (t) = Taken By, (c) = Cosigned By      Initials Name Provider Type    Anjali Stone OTR Occupational Therapist                        Manual Rx (Last 36 Hours)       Manual Treatments       Row Name 07/17/24 0922             Total Minutes    20897 - OT Manual Therapy Minutes 45  -CW                User Key  (r) = Recorded By, (t) = Taken By, (c) = Cosigned By      Initials Name Provider Type    Anjali Stone OTR Occupational Therapist                      Therapy Education  Given: Edema management, HEP, Symptoms/condition management, Bandaging/dressing change  Program: New  How Provided: Verbal, Demonstration, Written  Provided to: Patient  Level of Understanding: Verbalized  29643 - OT Self Care/Mgmt Minutes: 30                Time Calculation:   OT Start Time: 0735  OT Stop Time: 0850  OT Time Calculation (min): 75 min  Total Timed Code Minutes- OT: 75 minute(s)  Timed Charges  47693 - OT Manual Therapy Minutes: 45  85685 - OT Self Care/Mgmt Minutes: 30  Total Minutes  Timed Charges Total Minutes: 75   Total Minutes: 75     Therapy Charges for Today       Code Description Service Date Service  Provider Modifiers Qty    02167822106 HC OT MANUAL THERAPY EA 15 MIN 7/17/2024 Anjali Mathis OTR GO 3    10505556406 HC OT SELF CARE/MGMT/TRAIN EA 15 MIN 7/17/2024 Anjali Mathis OTR GO 2                        Anjali Mathis, OTR  7/17/2024

## 2024-07-22 ENCOUNTER — APPOINTMENT (OUTPATIENT)
Dept: OCCUPATIONAL THERAPY | Facility: HOSPITAL | Age: 79
End: 2024-07-22
Payer: MEDICARE

## 2024-07-22 DIAGNOSIS — R60.0 BILATERAL LEG EDEMA: ICD-10-CM

## 2024-07-22 NOTE — TELEPHONE ENCOUNTER
Caller: Sheela Soto    Relationship: Self    Best call back number: [phone]     Requested Prescriptions:   Requested Prescriptions     Pending Prescriptions Disp Refills    furosemide (Lasix) 40 MG tablet 30 tablet 0     Sig: Take 1 tablet by mouth Daily.        Pharmacy where request should be sent: [facility] - [place], [place] - [address] - [phone]  - [phone] FX     Last office visit with prescribing clinician: 6/25/2024   Last telemedicine visit with prescribing clinician: Visit date not found   Next office visit with prescribing clinician: 7/24/2024     Additional details provided by patient: PATIENT IS OUT OF THIS MEDICATION.     Does the patient have less than a 3 day supply:  [x] Yes  [] No    Would you like a call back once the refill request has been completed: [] Yes [x] No    Aarti Lowe Rep   07/22/24 14:09 EDT

## 2024-07-23 ENCOUNTER — APPOINTMENT (OUTPATIENT)
Dept: OCCUPATIONAL THERAPY | Facility: HOSPITAL | Age: 79
End: 2024-07-23
Payer: MEDICARE

## 2024-07-23 ENCOUNTER — TELEPHONE (OUTPATIENT)
Dept: INTERNAL MEDICINE | Facility: CLINIC | Age: 79
End: 2024-07-23
Payer: MEDICARE

## 2024-07-23 DIAGNOSIS — R60.0 BILATERAL LEG EDEMA: ICD-10-CM

## 2024-07-23 RX ORDER — SERTRALINE HYDROCHLORIDE 100 MG/1
100 TABLET, FILM COATED ORAL DAILY
Qty: 90 TABLET | Refills: 2 | Status: SHIPPED | OUTPATIENT
Start: 2024-07-23

## 2024-07-23 RX ORDER — FUROSEMIDE 40 MG/1
40 TABLET ORAL DAILY
Qty: 30 TABLET | Refills: 0 | OUTPATIENT
Start: 2024-07-23

## 2024-07-23 RX ORDER — FUROSEMIDE 40 MG/1
40 TABLET ORAL DAILY
Qty: 30 TABLET | Refills: 0 | Status: SHIPPED | OUTPATIENT
Start: 2024-07-23

## 2024-07-23 NOTE — TELEPHONE ENCOUNTER
The Lymphedema clinic is calling to see if we received an order for Dr. Valencia to sign for supplies. The faxed it and they were following up

## 2024-07-23 NOTE — TELEPHONE ENCOUNTER
Do not have anything from the lymphedema clinic yet.     OKAY FOR HUB TO SHARE INFORMATION ABOVE AND   ANY OTHER PREVIOUS MESSAGES IN THIS NOTE/CALL.

## 2024-07-24 NOTE — TELEPHONE ENCOUNTER
OPAL FROM Lymphedema clinic     HUB PROVIDED THE RELAY MESSAGE FROM THE OFFICE      ADDITIONAL INFORMATION: STATES FAXED OVER PAPERWORK BUT ALSO ROUTED ORDERS IN EPIC TO DR. WEBSTER ON 7/17/24 FOR COMPRESSION BANDAGES AND MISCELLANEOUS DME     CALLBACK # [phone]

## 2024-07-24 NOTE — TELEPHONE ENCOUNTER
Dr. Valencia signed order that was sent to her via Epic on 07.17.24       OKAY FOR HUB TO SHARE INFORMATION ABOVE AND   ANY OTHER PREVIOUS MESSAGES IN THIS NOTE/CALL.

## 2024-07-26 ENCOUNTER — HOSPITAL ENCOUNTER (OUTPATIENT)
Dept: CARDIOLOGY | Facility: HOSPITAL | Age: 79
Discharge: HOME | End: 2024-07-26
Payer: MEDICARE

## 2024-07-26 DIAGNOSIS — R09.89 BILATERAL CAROTID BRUITS: ICD-10-CM

## 2024-07-26 DIAGNOSIS — E78.2 MIXED HYPERLIPIDEMIA: ICD-10-CM

## 2024-07-26 DIAGNOSIS — I10 PRIMARY HYPERTENSION: ICD-10-CM

## 2024-07-26 LAB
BH CV XLRA MEAS LEFT DIST CCA EDV: -3.9 CM/SEC
BH CV XLRA MEAS LEFT DIST CCA PSV: -100.4 CM/SEC
BH CV XLRA MEAS LEFT DIST ICA EDV: -32.1 CM/SEC
BH CV XLRA MEAS LEFT DIST ICA PSV: -131.7 CM/SEC
BH CV XLRA MEAS LEFT ICA/CCA RATIO: 1.31
BH CV XLRA MEAS LEFT MID ICA EDV: -30.7 CM/SEC
BH CV XLRA MEAS LEFT MID ICA PSV: -92.7 CM/SEC
BH CV XLRA MEAS LEFT PROX CCA EDV: 19.9 CM/SEC
BH CV XLRA MEAS LEFT PROX CCA PSV: 104.4 CM/SEC
BH CV XLRA MEAS LEFT PROX ECA EDV: -10.2 CM/SEC
BH CV XLRA MEAS LEFT PROX ECA PSV: -134.1 CM/SEC
BH CV XLRA MEAS LEFT PROX ICA EDV: -14.3 CM/SEC
BH CV XLRA MEAS LEFT PROX ICA PSV: -82.1 CM/SEC
BH CV XLRA MEAS LEFT PROX SCLA PSV: 185.6 CM/SEC
BH CV XLRA MEAS LEFT VERTEBRAL A EDV: -18 CM/SEC
BH CV XLRA MEAS LEFT VERTEBRAL A PSV: -91.3 CM/SEC
BH CV XLRA MEAS RIGHT DIST CCA EDV: -28 CM/SEC
BH CV XLRA MEAS RIGHT DIST CCA PSV: -100 CM/SEC
BH CV XLRA MEAS RIGHT DIST ICA EDV: -16.5 CM/SEC
BH CV XLRA MEAS RIGHT DIST ICA PSV: -87.2 CM/SEC
BH CV XLRA MEAS RIGHT ICA/CCA RATIO: 0.93
BH CV XLRA MEAS RIGHT MID ICA EDV: -29 CM/SEC
BH CV XLRA MEAS RIGHT MID ICA PSV: -89.9 CM/SEC
BH CV XLRA MEAS RIGHT PROX CCA EDV: 28 CM/SEC
BH CV XLRA MEAS RIGHT PROX CCA PSV: 100 CM/SEC
BH CV XLRA MEAS RIGHT PROX ECA EDV: 8.4 CM/SEC
BH CV XLRA MEAS RIGHT PROX ECA PSV: 74.7 CM/SEC
BH CV XLRA MEAS RIGHT PROX ICA EDV: -20.1 CM/SEC
BH CV XLRA MEAS RIGHT PROX ICA PSV: -93.4 CM/SEC
BH CV XLRA MEAS RIGHT PROX SCLA PSV: 150.8 CM/SEC
BH CV XLRA MEAS RIGHT VERTEBRAL A EDV: -15.3 CM/SEC
BH CV XLRA MEAS RIGHT VERTEBRAL A PSV: -70.2 CM/SEC
LEFT ARM BP: NORMAL MMHG
RIGHT ARM BP: NORMAL MMHG

## 2024-07-26 PROCEDURE — 93880 EXTRACRANIAL BILAT STUDY: CPT

## 2024-07-29 ENCOUNTER — TELEPHONE (OUTPATIENT)
Dept: CARDIOLOGY | Facility: CLINIC | Age: 79
End: 2024-07-29
Payer: MEDICARE

## 2024-07-29 NOTE — TELEPHONE ENCOUNTER
Notified patient of results/recommendations. Patient verbalized understanding.    Shelley Ellison RN  Triage [facility]

## 2024-08-01 DIAGNOSIS — G25.81 RESTLESS LEG SYNDROME: ICD-10-CM

## 2024-08-01 RX ORDER — ROPINIROLE 1 MG/1
1 TABLET, FILM COATED ORAL 2 TIMES DAILY
Qty: 60 TABLET | Refills: 0 | Status: SHIPPED | OUTPATIENT
Start: 2024-08-01

## 2024-08-02 ENCOUNTER — HOSPITAL ENCOUNTER (OUTPATIENT)
Dept: OCCUPATIONAL THERAPY | Facility: HOSPITAL | Age: 79
Setting detail: THERAPIES SERIES
Discharge: HOME | End: 2024-08-02
Payer: MEDICARE

## 2024-08-02 DIAGNOSIS — I89.0 LYMPHEDEMA, NOT ELSEWHERE CLASSIFIED: Primary | ICD-10-CM

## 2024-08-02 PROCEDURE — 97140 MANUAL THERAPY 1/> REGIONS: CPT

## 2024-08-02 PROCEDURE — 97535 SELF CARE MNGMENT TRAINING: CPT

## 2024-08-02 NOTE — THERAPY PROGRESS REPORT/RE-CERT
Outpatient Occupational Therapy Lymphedema Progress Note  [facility]     Patient Name: Sheela Soto  : 1945  MRN: [id number]  Today's Date: 2024      Visit Date: 2024    Patient Active Problem List   Diagnosis    Colon polyps    Depression    GERD (gastroesophageal reflux disease)    Hyperlipidemia    Hypertension    Right knee DJD    Thyroid disorder    Restless leg syndrome    Diastolic dysfunction    Bilateral leg edema    Bilateral carotid bruits        Past Medical History:   Diagnosis Date    Anemia 3/30/2023    Had never been told this before…  put on iron prescription every other  day.day,    Anxiety , son killed in a motorcycle accident, put on zoloft.    My Son , age 27    Arthritis     This was noticeable in knees , had both knees replaced,     Cataract Years ago, surgeries       Kept watching them, had both eye surgeries. August and 2023.    Depression  my  27 year old son .    Zoloft. Helps me stay calm and not sad.    GERD (gastroesophageal reflux disease)     20 mg Omeprazole  just 10 mg’s daily works perfect    History of medical problems     Sleep Apnea. Use machine nightly    Hyperlipidemia     Hypertension     Hypothyroidism     Neuromuscular disorder Around     Restless Legs, both, on Prescription OpiniRole,  helps some…    Sleep apnea     Urinary tract infection     UTI Dr. Laura Fuller, on Estradiol cream, 0.015%    Visual impairment Age 8,      Wear readers now only.  Had both eyes cataract surgeries .        Past Surgical History:   Procedure Laterality Date    ADENOIDECTOMY      Adnoids? & tonsils as an 7 yr. Old    COLONOSCOPY  2005    Only once no problems    EYE SURGERY  August & 2023    Cataract surgeries both eyes 2 weeks apart.    HYSTERECTOMY      JOINT REPLACEMENT  2007 toes.    Knees    Both knees replaced, toes operated on,    REPLACEMENT TOTAL KNEE BILATERAL      TONSILLECTOMY      As an  7 year old child.  1952    TUBAL ABDOMINAL LIGATION  1988    No problems         Visit Dx:      ICD-10-CM ICD-9-CM   1. Lymphedema, not elsewhere classified  I89.0 457.1        Lymphedema       Row Name 08/02/24 1200             Subjective Pain    Able to rate subjective pain? yes  -CW      Pre-Treatment Pain Level 8  -CW      Post-Treatment Pain Level 8  -CW         Subjective    Subjective Comments Pain R in step at times  -CW         Skin Changes/Observations    Location/Assessment Lower Extremity  -CW      Lower Extremity Conditions bilateral:;normal;intact;dry  -CW      Lower Extremity Color/Pigment bilateral:;normal  -CW         Lymphedema Measurements    Measurement Type(s) Circumferential  -CW      Circumferential Areas Lower extremities  -CW         BLE Circumferential (cm)    Measurement Location 1 knee  -CW      Left 1 34.4 cm  -CW      Right 1 34.8 cm  -CW      Measurement Location 2 10 cm below knee  -CW      Left 2 35.4 cm  -CW      Right 2 35.5 cm  -CW      Measurement Location 3 20 cm below knee  -CW      Left 3 27.8 cm  -CW      Right 3 28.9 cm  -CW      Measurement Location 4 30 cm below knee  -CW      Left 4 22.5 cm  -CW      Right 4 24.8 cm  -CW      Measurement Location 5 ankle  -CW      Left 5 25.7 cm  -CW      Right 5 28.2 cm  -CW      Measurement Location 6 mid foot  -CW      Left 6 20.2 cm  -CW      Right 6 20.4 cm  -CW      LLE Circumferential Total 166 cm  -CW      RLE Circumferential Total 172.6 cm  -CW         Manual Lymphatic Drainage    Manual Lymphatic Drainage initial sequence;opened regional lymph nodes;opened anastamoses;extremity treatment  -CW      Initial Sequence short neck;abdomen  -CW      Abdomen superficial  -CW      Opened Regional Lymph Nodes axillary;inguinal  -CW      Axillary right;left  -CW      Inguinal right;left  -CW      Opened Anastamoses inguino-axillary  -CW      Inguino-Axillary right;left  -CW      Extremity Treatment MLD to full limb  -CW          Compression/Skin Care    Compression Garment Comments Tried on pt.'s stocking from home and checked fit.  -CW                User Key  (r) = Recorded By, (t) = Taken By, (c) = Cosigned By      Initials Name Provider Type    Anjali Stone OTR Occupational Therapist                             OT Assessment/Plan       Row Name 08/02/24 1344          OT Assessment    Assessment Comments Pt. reports pain R in step at times 8/10. Tenderness noted R proximal ankle area.  Pt. has been able to wear new stockings she was given from a frient that has less tightness R in step which may help to decrease pain. Skin dry and intact with no rashes or skin irritation. MLD sequence completed with pt. supine. Reviewed self MLD sequence and technique. Applied RLE compression stocking and checked fit. Total circumference .6 cm and .0 cm (3.4 cm R and 2.0 cm L net decrease). Recommend pt. to apply the compression bandages at night and the lighter compression stockings during the day. Pt. has received the velcro compression shipment by mail and can bring it in next tx session.  -CW        OT Plan    OT Plan Comments Plan to cont. lymph. tx and progress to indep. lymph. management. Pt. can attend tx. Fridays.  -CW               User Key  (r) = Recorded By, (t) = Taken By, (c) = Cosigned By      Initials Name Provider Type    Anjali Stone OTR Occupational Therapist                        Manual Rx (Last 36 Hours)       Manual Treatments       Row Name 08/02/24 1352             Total Minutes    17989 - OT Manual Therapy Minutes 45  -CW                User Key  (r) = Recorded By, (t) = Taken By, (c) = Cosigned By      Initials Name Provider Type    Anjali Stone OTR Occupational Therapist                   OT Goals       Row Name 08/02/24 1300          OT Short Term Goals    STG Date to Achieve 07/24/24  -CW     STG 1 Patient to demonstrate proper awareness of “What is Lymphedema” and DO’s and Don’ts” for improved  prevention, management, care of symptoms, and ease of transition to self-care of condition.  -CW     STG 1 Progress Ongoing;Progressing  -CW     STG 2 Patient independent and compliant with self-wrapping techniques of compression bandages with family member or caregiver as indicated for improved self-management of condition.  -CW     STG 2 Progress Progressing  -CW     STG 3 Patient demonstrate decreased net edema of  BLE's >3-/5 cm. for decreased in edema, symptoms, decreased risk of infection, and improved skin-care/transition to self-care of condition.  -CW     STG 3 Progress Met  -CW     STG 4 Patient independent and compliant with home exercise program (as able) focused on range of motion, flexibility, to improve lymphatic flow and discomfort.  -CW     STG 4 Progress Met  -CW        Long Term Goals    LTG Date to Achieve 08/07/24  -CW     LTG 1 Patient will demonstrate decreased net edema of  BLE's >/=5-10 cm. for decrease in symptoms, decreased risk of infection, and improved skin-care/transition to self-care of condition.  -CW     LTG 1 Progress Ongoing  -CW     LTG 2 Patient/family/caregivers independent with self-care techniques for self-management of condition.  -CW     LTG 2 Progress Ongoing  -CW     LTG 3 Patient independent and compliant with use and care of compression (example garments, inelastic velcro compression) with assistance of a caregiver as needed to promote self-care independence.  -CW     LTG 3 Progress Ongoing  -CW               User Key  (r) = Recorded By, (t) = Taken By, (c) = Cosigned By      Initials Name Provider Type    Anjali Stone OTR Occupational Therapist                    Therapy Education  Given: Symptoms/condition management, Edema management  Program: Reinforced, Progressed  How Provided: Verbal, Demonstration  Provided to: Patient  Level of Understanding: Verbalized  03523 - OT Self Care/Mgmt Minutes: 15                Time Calculation:   OT Start Time: 1230  OT Stop  Time: 1330  OT Time Calculation (min): 60 min  Total Timed Code Minutes- OT: 60 minute(s)  Timed Charges  74859 - OT Manual Therapy Minutes: 45  88692 - OT Self Care/Mgmt Minutes: 15  Total Minutes  Timed Charges Total Minutes: 60   Total Minutes: 60     Therapy Charges for Today       Code Description Service Date Service Provider Modifiers Qty    79023624166 HC OT MANUAL THERAPY EA 15 MIN 8/2/2024 Anjali Mathis OTR GO 3    79884286574 HC OT SELF CARE/MGMT/TRAIN EA 15 MIN 8/2/2024 Anjali Mathis OTR GO 1                        REINALDO Marcelino  8/2/2024

## 2024-08-09 ENCOUNTER — OFFICE VISIT (OUTPATIENT)
Dept: INTERNAL MEDICINE | Facility: CLINIC | Age: 79
End: 2024-08-09
Payer: MEDICARE

## 2024-08-09 VITALS
HEIGHT: 66 IN | WEIGHT: 157.4 LBS | BODY MASS INDEX: 25.3 KG/M2 | SYSTOLIC BLOOD PRESSURE: 118 MMHG | DIASTOLIC BLOOD PRESSURE: 62 MMHG | HEART RATE: 71 BPM | OXYGEN SATURATION: 95 %

## 2024-08-09 DIAGNOSIS — G25.81 RESTLESS LEG SYNDROME: ICD-10-CM

## 2024-08-09 DIAGNOSIS — I10 PRIMARY HYPERTENSION: Primary | ICD-10-CM

## 2024-08-09 PROCEDURE — 3074F SYST BP LT 130 MM HG: CPT | Performed by: STUDENT IN AN ORGANIZED HEALTH CARE EDUCATION/TRAINING PROGRAM

## 2024-08-09 PROCEDURE — 3078F DIAST BP <80 MM HG: CPT | Performed by: STUDENT IN AN ORGANIZED HEALTH CARE EDUCATION/TRAINING PROGRAM

## 2024-08-09 PROCEDURE — 99214 OFFICE O/P EST MOD 30 MIN: CPT | Performed by: STUDENT IN AN ORGANIZED HEALTH CARE EDUCATION/TRAINING PROGRAM

## 2024-08-09 RX ORDER — ROPINIROLE 2 MG/1
2 TABLET, FILM COATED ORAL 2 TIMES DAILY
Qty: 180 TABLET | Refills: 0 | Status: SHIPPED | OUTPATIENT
Start: 2024-08-09

## 2024-08-09 NOTE — PROGRESS NOTES
Stefan Valencia M.D.  Internal Medicine  [facility]  [address], [address]  [place] [address]  [phone]      Chief Complaint  Restless Legs Syndrome and Hypertension (BP has been low /)    SUBJECTIVE    History of Present Illness    Sheela Soto is a 79 y.o. female who presents to the office today as an established patient that last saw me on 6/25/2024.     RLS-on ropinirole which helps most of her symptoms    HTN-on losartan 50 mg and lasix 40 mg. At cardiology appointment she was advised to increase losartan to 50 mg daily. She is concerned her BP is too low. She felt dizzy yesterday. She did not take losartan  medication last night. BP today 123/52.     Lasix has not helped leg edema.     BP /51-53. BP cuff is working because her friend and daughter also check BP and it was consistent.     She likes lymphedema clinic and her OT there. States lymphedema is objectively improving.     Has pain in the evening. Has to elevate legs. She has daytime symptoms as well.     Review of Systems    Allergies   Allergen Reactions    Codeine Nausea Only    Latex Rash     Doesn't like to wear latex gloves          Outpatient Medications Marked as Taking for the 8/9/24 encounter (Office Visit) with Stefan Valencia MD   Medication Sig Dispense Refill    atorvastatin (LIPITOR) 20 MG tablet Take 1 tablet by mouth Daily. 90 tablet 2    Cholecalciferol (Vitamin D3) 75 MCG (3000 UT) tablet Take  by mouth.      estradiol (ESTRACE) 0.1 MG/GM vaginal cream Insert 2 g into the vagina Daily. 42.5 g 2    losartan (Cozaar) 50 MG tablet Take 1 tablet by mouth Every Night. 90 tablet 3    Omeprazole 20 MG Tablet Delayed Release Dispersible   Oral, Daily, 0 Refill(s)      rOPINIRole (REQUIP) 2 MG tablet Take 1 tablet by mouth 2 (Two) Times a Day. 180 tablet 0    sertraline (ZOLOFT) 100 MG tablet Take 1 tablet by mouth Daily. 90 tablet 2    Zinc Sulfate 220 (50 Zn) MG tablet Take  by mouth.      [DISCONTINUED]  furosemide (Lasix) 40 MG tablet Take 1 tablet by mouth Daily. 30 tablet 0    [DISCONTINUED] rOPINIRole (REQUIP) 1 MG tablet TAKE ONE TABLET BY MOUTH TWICE DAILY 60 tablet 0        Past Medical History:   Diagnosis Date    Anemia 3/30/2023    Had never been told this before…  put on iron prescription every other  day.day,    Anxiety , son killed in a motorcycle accident, put on zoloft.    My Son , age 27    Arthritis     This was noticeable in knees , had both knees replaced,     Cataract Years ago, surgeries       Kept watching them, had both eye surgeries. August and 2023.    Depression  my  27 year old son .    Zoloft. Helps me stay calm and not sad.    GERD (gastroesophageal reflux disease)     20 mg Omeprazole  just 10 mg’s daily works perfect    History of medical problems     Sleep Apnea. Use machine nightly    Hyperlipidemia     Hypertension     Hypothyroidism     Neuromuscular disorder Around     Restless Legs, both, on Prescription OpiniRole,  helps some…    Sleep apnea     Urinary tract infection     UTI Dr. Laura Fuller, on Estradiol cream, 0.015%    Visual impairment Age [age]    Wear readers now only.  Had both eyes cataract surgeries .     Past Surgical History:   Procedure Laterality Date    ADENOIDECTOMY      Adnoids? & tonsils as an 7 yr. Old    COLONOSCOPY  2005    Only once no problems    EYE SURGERY  August & 2023    Cataract surgeries both eyes 2 weeks apart.    HYSTERECTOMY      JOINT REPLACEMENT  2007 toes.    Knees    Both knees replaced, toes operated on,    REPLACEMENT TOTAL KNEE BILATERAL      TONSILLECTOMY      As an 7 year old child.      TUBAL ABDOMINAL LIGATION  1988    No problems     Family History   Problem Relation Age of Onset    Pneumonia Mother     Depression Mother         Depression runs in my family on both sides.    Hyperlipidemia Mother     Hypertension Mother         All five of us, me and 4  "siblings have  high blood pressure.    Miscarriages / Stillbirths Mother         She had miscarriages.    Vision loss Mother         Had Glaucoma later in life,  late 60’s.    Transient ischemic attack Mother     Hyperlipidemia Sister     Hypertension Sister         Age 77    Kidney disease Sister         Under a Kidney Specialist.    Hyperlipidemia Brother     Hyperlipidemia Brother     Hypertension Brother         Age 72    Hyperlipidemia Brother     Hypertension Brother         Age 70    Hyperlipidemia Brother     Hypertension Brother         Age 66    Autism Other     reports that she has never smoked. She has never been exposed to tobacco smoke. She has never used smokeless tobacco. She reports current alcohol use of about 3.0 standard drinks of alcohol per week. She reports that she does not use drugs.    OBJECTIVE    Vital Signs:   /62   Pulse 71   Ht 167.6 cm (65.98\")   Wt 71.4 kg (157 lb 6.4 oz)   SpO2 95%   BMI 25.42 kg/m²     Physical Exam  Constitutional:       Appearance: Normal appearance. She is normal weight.   Cardiovascular:      Rate and Rhythm: Normal rate and regular rhythm.      Heart sounds: Normal heart sounds. No murmur heard.  Pulmonary:      Effort: Pulmonary effort is normal.      Breath sounds: Normal breath sounds.   Musculoskeletal:      Right lower leg: Edema present.      Left lower leg: Edema present.      Comments: Wearing compression stockings     Skin:     General: Skin is warm and dry.   Neurological:      Mental Status: She is alert.   Psychiatric:         Mood and Affect: Mood normal.         Behavior: Behavior normal.         Thought Content: Thought content normal.            The following data was reviewed by: Stefan Valencia MD on 08/09/2024:  CMP          4/29/2024    14:16 5/23/2024    10:53 6/25/2024    10:03   CMP   Glucose 86  89  95    BUN 18  10  16    Creatinine 0.67  0.58  0.65    Sodium 138  137  139    Potassium 3.9  4.2  4.1    Chloride 100  99  101  "   Calcium 9.4  9.0  9.5    BUN/Creatinine Ratio 27  17.2  24.6      CBC w/diff          10/4/2023    11:33   CBC w/Diff   WBC 5.13    RBC 3.99    Hemoglobin 12.6    Hematocrit 36.9    MCV 92.5    MCH 31.6    MCHC 34.1    RDW 12.1    Platelets 237    Neutrophil Rel % 56.0    Lymphocyte Rel % 33.9    Monocyte Rel % 6.8    Eosinophil Rel % 2.1    Basophil Rel % 0.8      Lipid Panel          10/4/2023    11:33 5/23/2024    10:53   Lipid Panel   Total Cholesterol 174  264    Triglycerides 70  90    HDL Cholesterol 87  94    VLDL Cholesterol 13  15    LDL Cholesterol  74  155    LDL/HDL Ratio  1.62      TSH          10/4/2023    11:33   TSH   TSH 0.490      Data reviewed : cardiology note              ASSESSMENT & PLAN     Diagnoses and all orders for this visit:    1. Primary hypertension (Primary)    2. Restless leg syndrome  -     rOPINIRole (REQUIP) 2 MG tablet; Take 1 tablet by mouth 2 (Two) Times a Day.  Dispense: 180 tablet; Refill: 0      Stop lasix given low BP at home. It is not helping her edema. Consider decreasing losartan if she continues to have symptoms.     Okay to try magnesium    Increase Requip        Health Maintenance Due   Topic Date Due    RSV Vaccine - Adults (1 - 1-dose 60+ series) Never done    COVID-19 Vaccine (5 - 2023-24 season) 09/01/2023    INFLUENZA VACCINE  08/01/2024    ANNUAL WELLNESS VISIT  10/04/2024        Follow Up  No follow-ups on file.    Patient/family had no further questions at this time and verbalized understanding of the plan discussed today.

## 2024-08-19 DIAGNOSIS — R60.0 BILATERAL LEG EDEMA: ICD-10-CM

## 2024-08-20 RX ORDER — FUROSEMIDE 40 MG/1
40 TABLET ORAL DAILY
Qty: 30 TABLET | Refills: 0 | OUTPATIENT
Start: 2024-08-20

## 2024-08-27 DIAGNOSIS — G25.81 RESTLESS LEG SYNDROME: ICD-10-CM

## 2024-08-27 RX ORDER — ROPINIROLE 1 MG/1
1 TABLET, FILM COATED ORAL 2 TIMES DAILY
Qty: 60 TABLET | Refills: 0 | OUTPATIENT
Start: 2024-08-27

## 2024-09-04 ENCOUNTER — OFFICE VISIT (OUTPATIENT)
Dept: SLEEP MEDICINE | Facility: HOSPITAL | Age: 79
End: 2024-09-04
Payer: MEDICARE

## 2024-09-04 VITALS — HEART RATE: 64 BPM | OXYGEN SATURATION: 96 % | BODY MASS INDEX: 23.43 KG/M2 | WEIGHT: 158.2 LBS | HEIGHT: 69 IN

## 2024-09-04 DIAGNOSIS — G47.33 OBSTRUCTIVE SLEEP APNEA: Primary | ICD-10-CM

## 2024-09-04 PROCEDURE — G0463 HOSPITAL OUTPT CLINIC VISIT: HCPCS

## 2024-09-04 PROCEDURE — 1159F MED LIST DOCD IN RCRD: CPT | Performed by: FAMILY MEDICINE

## 2024-09-04 PROCEDURE — 1160F RVW MEDS BY RX/DR IN RCRD: CPT | Performed by: FAMILY MEDICINE

## 2024-09-04 PROCEDURE — 99213 OFFICE O/P EST LOW 20 MIN: CPT | Performed by: FAMILY MEDICINE

## 2024-09-04 NOTE — PROGRESS NOTES
"Follow Up [facility] Note     Chief Complaint:  OMAYRA     Primary Care Physician: Stefan Valencia MD    Interval History:   The patient is a 79 y.o. female  who was last seen in the sleep lab: 7/12/2023.  Presents today for annual follow-up on OMAYRA and restless leg syndrome.  PSG and showed AHI of 7 events per hour lowest SpO2 84%.  On ropinirole 0.25 mg nightly for RLS.  At last visit was on iron supplementation due to ferritin being 18.  At last visit advised to continue CPAP 8 cm H2O and continue ropinirole as dosed.  Since last visit has lymphedema in legs and is attending lymphedema clinic.  Nasal pillow mask fits well and there is some dry mouth.  Ropinirole has been refilled by primary care.    Downloaded PAP Data Reviewed For Compliance:  DME: [facility]; Data range:8/5/24-93/24; Average usage: 4h 40m; Average AHI: 1.5, Average PAP pressure: 8.0 cm H2O.      Review of Systems:    A complete review of systems was done and all were negative with the exception of acid reflux depression    Social History:    Social History     Socioeconomic History    Marital status:     Number of children: 2   Tobacco Use    Smoking status: Never     Passive exposure: Never    Smokeless tobacco: Never    Tobacco comments:     Caffeine: 2-3 cups daily   Vaping Use    Vaping status: Never Used   Substance and Sexual Activity    Alcohol use: Yes     Alcohol/week: 3.0 standard drinks of alcohol     Types: 3 Glasses of wine per week     Comment: I drink “red” wine with dinner sometimes.    Drug use: Never    Sexual activity: Not Currently     Partners: Male     Birth control/protection: None, Tubal ligation, Hysterectomy       Allergies:  Codeine and Latex     Medication Review:  Reviewed.      Vital Signs:    Vitals:    09/04/24 0900   Pulse: 64   SpO2: 96%   Weight: 71.8 kg (158 lb 3.2 oz)   Height: 174 cm (68.5\")     Body mass index is 23.7 kg/m².    Physical Exam:    Constitutional:  Well developed 79 y.o. female that " appears in no apparent distress.  Awake & oriented times 3.  Normal mood with normal recent and remote memory and normal judgement.  Eyes:  Conjunctivae normal.  Oropharynx: Previously, moist mucous membranes.    Self-administered [place] Sleepiness Scale test results: 1  0-5 Lower normal daytime sleepiness  6-10 Higher normal daytime sleepiness  11-12 Mild, 13-15 Moderate, & 16-24 Severe excessive daytime sleepiness    Impression:   1. Obstructive sleep apnea        Obstructive sleep apnea adequately treated with CPAP 8 cm H2O with good compliance and usage and no complaints of hypersomnolence.    Patient uses the CPAP device and benefits from its use in terms of reduction of hypersomnia and snoring.Weight loss will be strongly beneficial to reduce the severity of sleep-disordered breathing.  Caution during activities that require prolonged concentration is strongly advised if sleepiness returns. Changing of PAP supplies regularly is important for effective use. Patient needs to change cushion on the mask or plugs on nasal pillows along with disposable filters once every month and change mask frame, tubing, headgear and Velcro straps every 6 months at the minimum.    Plan:  Good sleep hygiene measures should be maintained.  Normal body weight by Body mass index is 23.7 kg/m²..      I answered all of the patient's questions.  The patient will call for any problems and will follow up in 1 year.      Claribel Lau MD  Sleep Medicine  09/04/24  11:21 EDT

## 2024-09-18 RX ORDER — LEVOTHYROXINE SODIUM 112 UG/1
112 TABLET ORAL DAILY
Qty: 180 TABLET | Refills: 0 | Status: SHIPPED | OUTPATIENT
Start: 2024-09-18

## 2024-09-19 RX ORDER — ATORVASTATIN CALCIUM 20 MG/1
TABLET, FILM COATED ORAL
Qty: 90 TABLET | Refills: 2 | Status: SHIPPED | OUTPATIENT
Start: 2024-09-19

## 2024-09-23 ENCOUNTER — TELEPHONE (OUTPATIENT)
Dept: CARDIOLOGY | Facility: CLINIC | Age: 79
End: 2024-09-23
Payer: MEDICARE

## 2024-09-23 DIAGNOSIS — R60.0 BILATERAL LEG EDEMA: Primary | ICD-10-CM

## 2024-09-24 DIAGNOSIS — R60.0 BILATERAL LEG EDEMA: Primary | ICD-10-CM

## 2024-10-02 ENCOUNTER — HOSPITAL ENCOUNTER (OUTPATIENT)
Dept: CARDIOLOGY | Facility: HOSPITAL | Age: 79
Discharge: HOME | End: 2024-10-02
Admitting: FAMILY MEDICINE
Payer: MEDICARE

## 2024-10-02 DIAGNOSIS — R60.0 BILATERAL LEG EDEMA: ICD-10-CM

## 2024-10-02 LAB

## 2024-10-02 PROCEDURE — 93970 EXTREMITY STUDY: CPT

## 2024-10-04 NOTE — PROGRESS NOTES
Lu-I left this in your in basket to see if that is due anything further to treat the edema.  Thank you

## 2024-10-07 ENCOUNTER — TELEPHONE (OUTPATIENT)
Dept: CARDIOLOGY | Facility: CLINIC | Age: 79
End: 2024-10-07
Payer: MEDICARE

## 2024-10-07 NOTE — TELEPHONE ENCOUNTER
I spoke with Sheela Soto and updated pt on results from provider.  Pt verbalized understanding and has no further questions at this time.    Thank you,    Chelsey BENEDICT, RN  Triage [facility] [place]  10/07/24 09:07 EDT

## 2024-11-06 DIAGNOSIS — G25.81 RESTLESS LEG SYNDROME: ICD-10-CM

## 2024-11-06 RX ORDER — ROPINIROLE 2 MG/1
TABLET, FILM COATED ORAL
Qty: 180 TABLET | Refills: 0 | Status: SHIPPED | OUTPATIENT
Start: 2024-11-06

## 2024-12-02 ENCOUNTER — NURSE TRIAGE (OUTPATIENT)
Dept: CALL CENTER | Facility: HOSPITAL | Age: 79
End: 2024-12-02
Payer: MEDICARE

## 2024-12-02 ENCOUNTER — TELEPHONE (OUTPATIENT)
Dept: CARDIOLOGY | Facility: CLINIC | Age: 79
End: 2024-12-02
Payer: MEDICARE

## 2024-12-02 NOTE — TELEPHONE ENCOUNTER
Fine to hold the losartan for now until he is seen as he is not getting too hypertensive thank you

## 2024-12-02 NOTE — TELEPHONE ENCOUNTER
"Pt called in because her DBP has been running in the 50s for weeks.  She's presently lightheaded, dizzy, and not feeling well.  She says \"I don't feel like myself.  I just don't have any energy\".  She has held losartan on 12/1 and on 11/30; she normally takes this in the evening.  She had her BP machine recently checked at PCP office and it's working correctly.        Do you have any recommendations for this patient?    Thank you,    Chelsey BENEDICT RN  [facility]  12/02/24  12:35 EST    "

## 2024-12-02 NOTE — TELEPHONE ENCOUNTER
"C/o DBP 50s for several days with lightheadedness and dizziness at times. /55 HR 61 today. Has not taken blood pressure medication for 2 days. /55 yesterday evening. Patient also fatigued. Reviewed protocol with patient. Connected patient with Shruthi at Dr. Valencia's office to try to schedule appointment sooner than 12/5.    Reason for Disposition   [1] Fall in systolic BP > 20 mm Hg from normal AND [2] NOT dizzy, lightheaded, or weak    Additional Information   Negative: Started suddenly after an allergic medicine, an allergic food, or bee sting   Negative: Shock suspected (e.g., cold/pale/clammy skin, too weak to stand, low BP, rapid pulse)   Negative: Difficult to awaken or acting confused (e.g., disoriented, slurred speech)   Negative: Fainted   Negative: [1] Systolic BP < 90 AND [2] dizzy, lightheaded, or weak   Negative: Chest pain   Negative: Bleeding (e.g., vomiting blood, rectal bleeding or tarry stools, severe vaginal bleeding)(Exception: Fainted from sight of small amount of blood; small cut or abrasion.)   Negative: Extra heartbeats, irregular heart beating, or heart is beating very fast  (i.e., \"palpitations\")   Negative: Sounds like a life-threatening emergency to the triager   Negative: [1] Systolic BP < 80 AND [2] NOT dizzy, lightheaded or weak   Negative: Abdominal pain   Negative: Fever > 100.4 F (38.0 C)   Negative: Major surgery in the past month   Negative: [1] Drinking very little AND [2] dehydration suspected (e.g., no urine > 12 hours, very dry mouth, very lightheaded)   Negative: [1] Fall in systolic BP > 20 mm Hg from normal AND [2] dizzy, lightheaded, or weak   Negative: Patient sounds very sick or weak to the triager   Negative: [1] Systolic BP < 90 AND [2] NOT dizzy, lightheaded or weak   Negative: [1] Systolic BP  AND [2] taking blood pressure medications AND [3] dizzy, lightheaded or weak   Negative: [1] Systolic BP  AND [2] taking blood pressure medications AND " "[3] NOT dizzy, lightheaded or weak    Answer Assessment - Initial Assessment Questions  1. BLOOD PRESSURE: \"What is the blood pressure?\" \"Did you take at least two measurements 5 minutes apart?\"      139/55  2. ONSET: \"When did you take your blood pressure?\"      This morning  3. HOW: \"How did you obtain the blood pressure?\" (e.g., visiting nurse, automatic home BP monitor)      Automatic home BP monitor  4. HISTORY: \"Do you have a history of low blood pressure?\" \"What is your blood pressure normally?\"      Yes, chronic  5. MEDICINES: \"Are you taking any medications for blood pressure?\" If Yes, ask: \"Have they been changed recently?\"      Yes, has not taken for 2 days  6. PULSE RATE: \"Do you know what your pulse rate is?\"       61  7. OTHER SYMPTOMS: \"Have you been sick recently?\" \"Have you had a recent injury?\"      Fatigue, lightheaded, dizziness  8. PREGNANCY: \"Is there any chance you are pregnant?\" \"When was your last menstrual period?\"      No    Protocols used: Blood Pressure - Low-ADULT-AH    "

## 2024-12-03 NOTE — TELEPHONE ENCOUNTER
I tried to call Sheela Soto but there was no answer.  I left a detailed message relaying information from provider regarding medication.  I encouraged pt to call our office back if they have any questions.    Thank you,    Chelsey BENEDICT RN  Triage [facility]  12/03/24 08:38 EST

## 2024-12-04 ENCOUNTER — OFFICE VISIT (OUTPATIENT)
Dept: INTERNAL MEDICINE | Facility: CLINIC | Age: 79
End: 2024-12-04
Payer: MEDICARE

## 2024-12-04 VITALS
BODY MASS INDEX: 23.28 KG/M2 | HEART RATE: 66 BPM | WEIGHT: 157.2 LBS | SYSTOLIC BLOOD PRESSURE: 124 MMHG | HEIGHT: 69 IN | OXYGEN SATURATION: 97 % | DIASTOLIC BLOOD PRESSURE: 72 MMHG

## 2024-12-04 DIAGNOSIS — M20.42 HAMMER TOE OF LEFT FOOT: ICD-10-CM

## 2024-12-04 DIAGNOSIS — F33.42 RECURRENT MAJOR DEPRESSIVE DISORDER, IN FULL REMISSION: ICD-10-CM

## 2024-12-04 DIAGNOSIS — E61.1 IRON DEFICIENCY: ICD-10-CM

## 2024-12-04 DIAGNOSIS — Z79.899 HIGH RISK MEDICATION USE: ICD-10-CM

## 2024-12-04 DIAGNOSIS — R79.9 ABNORMAL FINDING OF BLOOD CHEMISTRY, UNSPECIFIED: ICD-10-CM

## 2024-12-04 DIAGNOSIS — I89.0 LYMPHEDEMA: ICD-10-CM

## 2024-12-04 DIAGNOSIS — I10 PRIMARY HYPERTENSION: Primary | ICD-10-CM

## 2024-12-04 DIAGNOSIS — E03.9 HYPOTHYROIDISM, UNSPECIFIED TYPE: ICD-10-CM

## 2024-12-04 DIAGNOSIS — G25.81 RESTLESS LEG SYNDROME: ICD-10-CM

## 2024-12-04 PROCEDURE — 3078F DIAST BP <80 MM HG: CPT | Performed by: STUDENT IN AN ORGANIZED HEALTH CARE EDUCATION/TRAINING PROGRAM

## 2024-12-04 PROCEDURE — 99214 OFFICE O/P EST MOD 30 MIN: CPT | Performed by: STUDENT IN AN ORGANIZED HEALTH CARE EDUCATION/TRAINING PROGRAM

## 2024-12-04 PROCEDURE — 3074F SYST BP LT 130 MM HG: CPT | Performed by: STUDENT IN AN ORGANIZED HEALTH CARE EDUCATION/TRAINING PROGRAM

## 2024-12-04 RX ORDER — GABAPENTIN 100 MG/1
100 CAPSULE ORAL NIGHTLY
Qty: 30 CAPSULE | Refills: 0 | Status: SHIPPED | OUTPATIENT
Start: 2024-12-04

## 2024-12-04 NOTE — ASSESSMENT & PLAN NOTE
Stop ropinirole.have increased dose twice and patient only had more side effects. Start gabapentin.     As part of the patient's treatment plan, I am prescribing controlled substances. The patient has been made aware of appropriate use of such medications. It has also been made clear that these medications are for use by this patient only, without concomitant use of alcohol or other substances unless prescribed.      Patient has completed prescribing agreement detailing terms of continued prescribing of controlled substances, including monitoring MARY reports, urine drug screening, and pill counts if necessary. The patient is aware that inappropriate use will results in cessation of prescribing such medications.     As the clinician, I personally reviewed the MARY from 12/04/2024 while the patient was in the office today.     History and physical exam exhibit continued safe and appropriate use of controlled substances.    Orders:    Iron and TIBC    Ferritin    gabapentin (NEURONTIN) 100 MG capsule; Take 1 capsule by mouth Every Night.

## 2024-12-04 NOTE — PROGRESS NOTES
Stefan Valencia M.D.  Internal Medicine  [facility]  [address], [address]  [place] [address]  [phone]      Chief Complaint  Fatigue, Hypotension, and Restless Legs Syndrome    SUBJECTIVE    History of Present Illness    Sheela Soto is a 79 y.o. female with restless leg syndrome, hypertension, chronic lymphedema, hypothyroidism, hyperlipidemia, GERD who presents to the office today as an established patient that last saw me on 8/9/2024.     Reports diastolic blood pressure has been in the 50s for several days with lightheadedness and dizziness.  Does not feel herself. Felt like she was going to faint walking her dog. She stopped her blood pressure medication per cardiologist recommendation.  Has not taken losartan in 3 days. BP cpntrolled. At previous appointment she was advised to stop her Lasix. Previously took losartan 25 mg but this was increased to 50 mg by cardiology.     RLS-initially on 0.5 mg, increased to 1 mg nightly. Now on 2 mg BID. hinks ropinirole is too strong. Makes her sick to stomach and feels drained. Worndering about Pregabalin and gabapentin for RLS.     Cardiology ordered venous duplex ultrasound which was normal    Goes to lymphedema clinic.  Elevates legs. Wears compression stocking with relief of symptoms.     Follows with sleep medicine for OMAYRA    Hands are cold.     History of wearing heels. Has hammertoe surgery. Concerned metal is eroding through. Compression stocking squeeze toes.    Would like to stop Zoloft.  Has been on for over 20 years.     Review of Systems    Allergies   Allergen Reactions    Codeine Nausea Only    Latex Rash     Doesn't like to wear latex gloves          Outpatient Medications Marked as Taking for the 12/4/24 encounter (Office Visit) with Stefan Valencia MD   Medication Sig Dispense Refill    atorvastatin (LIPITOR) 20 MG tablet TAKE ONE TABLET BY MOUTH DAILY **discontinue rosuvastatin** 90 tablet 2    Cholecalciferol (Vitamin D3)  75 MCG (3000 UT) tablet Take  by mouth.      estradiol (ESTRACE) 0.1 MG/GM vaginal cream Insert 2 g into the vagina Daily. 42.5 g 2    levothyroxine (SYNTHROID, LEVOTHROID) 112 MCG tablet TAKE ONE TABLET BY MOUTH DAILY 180 tablet 0    losartan (Cozaar) 50 MG tablet Take 1 tablet by mouth Every Night. 90 tablet 3    Omeprazole 20 MG Tablet Delayed Release Dispersible   Oral, Daily, 0 Refill(s)      rOPINIRole (REQUIP) 2 MG tablet TAKE ONE TABLET BY MOUTH TWICE DAILY *new dose* 180 tablet 0    sertraline (ZOLOFT) 100 MG tablet Take 1 tablet by mouth Daily. 90 tablet 2    Zinc Sulfate 220 (50 Zn) MG tablet Take  by mouth.          Past Medical History:   Diagnosis Date    Anemia 3/30/2023    Had never been told this before…  put on iron prescription every other  day.day,    Anxiety , son killed in a motorcycle accident, put on zoloft.    My Son , age 27    Arthritis     This was noticeable in knees , had both knees replaced,     Cataract Years ago, surgeries       Kept watching them, had both eye surgeries. August and 2023.    Depression  my  27 year old son .    Zoloft. Helps me stay calm and not sad.    GERD (gastroesophageal reflux disease)     20 mg Omeprazole  just 10 mg’s daily works perfect    History of medical problems     Sleep Apnea. Use machine nightly    Hyperlipidemia     Hypertension     Hypothyroidism     Neuromuscular disorder Around     Restless Legs, both, on Prescription OpiniRole,  helps some…    Sleep apnea     Urinary tract infection     UTI Dr. Laura Fuller, on Estradiol cream, 0.015%    Visual impairment Age [age]    Wear readers now only.  Had both eyes cataract surgeries .     Past Surgical History:   Procedure Laterality Date    ADENOIDECTOMY      Adnoids? & tonsils as an 7 yr. Old    COLONOSCOPY  2005    Only once no problems    EYE SURGERY  August & 2023    Cataract surgeries both eyes 2 weeks apart.    HYSTERECTOMY      JOINT  "REPLACEMENT  2007 toes.  2014  Knees    Both knees replaced, toes operated on,    REPLACEMENT TOTAL KNEE BILATERAL      TONSILLECTOMY  1952    As an 7 year old child.  1952    TUBAL ABDOMINAL LIGATION  1988    No problems     Family History   Problem Relation Age of Onset    Pneumonia Mother     Depression Mother         Depression runs in my family on both sides.    Hyperlipidemia Mother     Hypertension Mother         All five of us, me and 4 siblings have  high blood pressure.    Miscarriages / Stillbirths Mother         She had miscarriages.    Vision loss Mother         Had Glaucoma later in life,  late 60’s.    Transient ischemic attack Mother     Hyperlipidemia Sister     Hypertension Sister         Age 77    Kidney disease Sister         Under a Kidney Specialist.    Hyperlipidemia Brother     Hyperlipidemia Brother     Hypertension Brother         Age 72    Hyperlipidemia Brother     Hypertension Brother         Age 70    Hyperlipidemia Brother     Hypertension Brother         Age 66    Autism Other     reports that she has never smoked. She has never been exposed to tobacco smoke. She has never used smokeless tobacco. She reports current alcohol use of about 3.0 standard drinks of alcohol per week. She reports that she does not use drugs.    OBJECTIVE    Vital Signs:   /72   Pulse 66   Ht 174 cm (68.5\")   Wt 71.3 kg (157 lb 3.2 oz)   SpO2 97%   BMI 23.55 kg/m²     Physical Exam  Constitutional:       Appearance: Normal appearance. She is normal weight.   Cardiovascular:      Rate and Rhythm: Normal rate and regular rhythm.      Heart sounds: Normal heart sounds. No murmur heard.  Pulmonary:      Effort: Pulmonary effort is normal.      Breath sounds: Normal breath sounds.   Skin:     General: Skin is warm and dry.   Neurological:      Mental Status: She is alert.   Psychiatric:         Mood and Affect: Mood normal.         Behavior: Behavior normal.         Thought Content: Thought content " normal.        Callus second toe.     The following data was reviewed by: Stefan Valencia MD on 12/04/2024:  CMP          4/29/2024    14:16 5/23/2024    10:53 6/25/2024    10:03   CMP   Glucose 86  89  95    BUN 18  10  16    Creatinine 0.67  0.58  0.65    Sodium 138  137  139    Potassium 3.9  4.2  4.1    Chloride 100  99  101    Calcium 9.4  9.0  9.5    BUN/Creatinine Ratio 27  17.2  24.6        Lipid Panel          5/23/2024    10:53   Lipid Panel   Total Cholesterol 264    Triglycerides 90    HDL Cholesterol 94    VLDL Cholesterol 15    LDL Cholesterol  155    LDL/HDL Ratio 1.62                Telephone with Chelsey Price RN (12/02/2024)   Duplex Venous Lower Extremity - Bilateral CAR (10/02/2024 12:44)     ASSESSMENT & PLAN     Assessment & Plan  Primary hypertension  Diastolic BP low with symptoms.Stop losartan until cardiology appointment. BP controlled today off medication     Orders:    Basic Metabolic Panel    Restless leg syndrome  Stop ropinirole.have increased dose twice and patient only had more side effects. Start gabapentin.     As part of the patient's treatment plan, I am prescribing controlled substances. The patient has been made aware of appropriate use of such medications. It has also been made clear that these medications are for use by this patient only, without concomitant use of alcohol or other substances unless prescribed.      Patient has completed prescribing agreement detailing terms of continued prescribing of controlled substances, including monitoring MARY reports, urine drug screening, and pill counts if necessary. The patient is aware that inappropriate use will results in cessation of prescribing such medications.     As the clinician, I personally reviewed the MARY from 12/04/2024 while the patient was in the office today.     History and physical exam exhibit continued safe and appropriate use of controlled substances.    Orders:    Iron and TIBC    Ferritin    gabapentin  (NEURONTIN) 100 MG capsule; Take 1 capsule by mouth Every Night.    Lymphedema  Recommend Compression stockings.        Iron deficiency  Discussed this can contribute to RLS  Orders:    CBC & Differential    High risk medication use    Orders:    Urine Drug Screen - Urine, Clean Catch    Hypothyroidism, unspecified type    Orders:    TSH Rfx On Abnormal To Free T4    Hammer toe of left foot  Wear brace on second toe  Orders:    Ambulatory Referral to Podiatry    Recurrent major depressive disorder, in full remission  Patient's depression is a recurrent episode  . Depression is in full remission and stable.     Plan: Discuss weaning Zoloft in 1 month.          Abnormal finding of blood chemistry, unspecified    Orders:    Iron and TIBC    Ferritin          Health Maintenance Due   Topic Date Due    TDAP/TD VACCINES (1 - Tdap) Never done    RSV Vaccine - Adults (1 - 1-dose 75+ series) Never done    INFLUENZA VACCINE  07/01/2024    COVID-19 Vaccine (5 - 2024-25 season) 09/01/2024    ANNUAL WELLNESS VISIT  10/04/2024        Follow Up  No follow-ups on file.    Patient/family had no further questions at this time and verbalized understanding of the plan discussed today.

## 2024-12-04 NOTE — ASSESSMENT & PLAN NOTE
Diastolic BP low with symptoms.Stop losartan until cardiology appointment. BP controlled today off medication     Orders:    Basic Metabolic Panel

## 2024-12-04 NOTE — ASSESSMENT & PLAN NOTE
Patient's depression is a recurrent episode  . Depression is in full remission and stable.     Plan: Discuss weaning Zoloft in 1 month.

## 2024-12-05 LAB
AMPHETAMINES UR QL SCN: NEGATIVE NG/ML
BARBITURATES UR QL SCN: NEGATIVE NG/ML
BASOPHILS # BLD AUTO: 0 X10E3/UL (ref 0–0.2)
BASOPHILS NFR BLD AUTO: 1 %
BENZODIAZ UR QL SCN: NEGATIVE NG/ML
BUN SERPL-MCNC: 16 MG/DL (ref 8–27)
BUN/CREAT SERPL: 22 (ref 12–28)
BZE UR QL SCN: NEGATIVE NG/ML
CALCIUM SERPL-MCNC: 9.2 MG/DL (ref 8.7–10.3)
CANNABINOIDS UR QL SCN: NEGATIVE NG/ML
CHLORIDE SERPL-SCNC: 105 MMOL/L (ref 96–106)
CO2 SERPL-SCNC: 23 MMOL/L (ref 20–29)
CREAT SERPL-MCNC: 0.72 MG/DL (ref 0.57–1)
CREAT UR-MCNC: 83.2 MG/DL (ref 20–300)
EGFRCR SERPLBLD CKD-EPI 2021: 84 ML/MIN/1.73
EOSINOPHIL # BLD AUTO: 0.1 X10E3/UL (ref 0–0.4)
EOSINOPHIL NFR BLD AUTO: 3 %
ERYTHROCYTE [DISTWIDTH] IN BLOOD BY AUTOMATED COUNT: 11.9 % (ref 11.7–15.4)
FERRITIN SERPL-MCNC: 39 NG/ML (ref 15–150)
GLUCOSE SERPL-MCNC: 92 MG/DL (ref 70–99)
HCT VFR BLD AUTO: 38.3 % (ref 34–46.6)
HGB BLD-MCNC: 12.7 G/DL (ref 11.1–15.9)
IMM GRANULOCYTES # BLD AUTO: 0 X10E3/UL (ref 0–0.1)
IMM GRANULOCYTES NFR BLD AUTO: 0 %
IRON SATN MFR SERPL: 25 % (ref 15–55)
IRON SERPL-MCNC: 82 UG/DL (ref 27–139)
LABORATORY COMMENT REPORT: NORMAL
LYMPHOCYTES # BLD AUTO: 1.9 X10E3/UL (ref 0.7–3.1)
LYMPHOCYTES NFR BLD AUTO: 35 %
MCH RBC QN AUTO: 30.8 PG (ref 26.6–33)
MCHC RBC AUTO-ENTMCNC: 33.2 G/DL (ref 31.5–35.7)
MCV RBC AUTO: 93 FL (ref 79–97)
METHADONE UR QL SCN: NEGATIVE NG/ML
MONOCYTES # BLD AUTO: 0.4 X10E3/UL (ref 0.1–0.9)
MONOCYTES NFR BLD AUTO: 8 %
NEUTROPHILS # BLD AUTO: 2.9 X10E3/UL (ref 1.4–7)
NEUTROPHILS NFR BLD AUTO: 53 %
OPIATES UR QL SCN: NEGATIVE NG/ML
OXYCODONE+OXYMORPHONE UR QL SCN: NEGATIVE NG/ML
PCP UR QL: NEGATIVE NG/ML
PH UR: 6.9 [PH] (ref 4.5–8.9)
PLATELET # BLD AUTO: 247 X10E3/UL (ref 150–450)
POTASSIUM SERPL-SCNC: 4.2 MMOL/L (ref 3.5–5.2)
PROPOXYPH UR QL SCN: NEGATIVE NG/ML
RBC # BLD AUTO: 4.13 X10E6/UL (ref 3.77–5.28)
SODIUM SERPL-SCNC: 141 MMOL/L (ref 134–144)
TIBC SERPL-MCNC: 331 UG/DL (ref 250–450)
TSH SERPL DL<=0.005 MIU/L-ACNC: 1.75 UIU/ML (ref 0.45–4.5)
UIBC SERPL-MCNC: 249 UG/DL (ref 118–369)
WBC # BLD AUTO: 5.4 X10E3/UL (ref 3.4–10.8)

## 2024-12-07 DIAGNOSIS — N89.8 VAGINAL DRYNESS: ICD-10-CM

## 2024-12-09 RX ORDER — ESTRADIOL 0.1 MG/G
CREAM VAGINAL
Qty: 42.5 G | Refills: 2 | Status: SHIPPED | OUTPATIENT
Start: 2024-12-09

## 2024-12-09 RX ORDER — ESTRADIOL 0.04 MG/D
1 PATCH, EXTENDED RELEASE TRANSDERMAL 2 TIMES WEEKLY
COMMUNITY
Start: 2024-12-07

## 2024-12-11 ENCOUNTER — OFFICE VISIT (OUTPATIENT)
Dept: CARDIOLOGY | Facility: CLINIC | Age: 79
End: 2024-12-11
Payer: MEDICARE

## 2024-12-11 VITALS
HEIGHT: 69 IN | SYSTOLIC BLOOD PRESSURE: 116 MMHG | HEART RATE: 56 BPM | OXYGEN SATURATION: 96 % | WEIGHT: 152 LBS | DIASTOLIC BLOOD PRESSURE: 66 MMHG | BODY MASS INDEX: 22.51 KG/M2

## 2024-12-11 DIAGNOSIS — E78.49 OTHER HYPERLIPIDEMIA: ICD-10-CM

## 2024-12-11 DIAGNOSIS — I10 HYPERTENSION, UNSPECIFIED TYPE: ICD-10-CM

## 2024-12-11 DIAGNOSIS — R42 DIZZINESS: Primary | ICD-10-CM

## 2024-12-11 DIAGNOSIS — I51.89 DIASTOLIC DYSFUNCTION: ICD-10-CM

## 2024-12-11 RX ORDER — SERTRALINE HYDROCHLORIDE 100 MG/1
100 TABLET, FILM COATED ORAL DAILY
Qty: 90 TABLET | Refills: 2 | Status: SHIPPED | OUTPATIENT
Start: 2024-12-11

## 2024-12-11 RX ORDER — LEVOTHYROXINE SODIUM 112 UG/1
112 TABLET ORAL DAILY
Qty: 180 TABLET | Refills: 0 | Status: SHIPPED | OUTPATIENT
Start: 2024-12-11

## 2024-12-11 NOTE — PROGRESS NOTES
Date of Office Visit: 2024  Encounter Provider: BRIANA Bell  Place of Service: [facility] CARDIOLOGY  Established cardiologist: Ginny Abreu MD  Patient Name: Sheela Soot  :1945      Patient ID:  Sheela Soto is a 79 y.o. female is here for  followup    With a pertinent medical history of hypertension, hyperlipidemia, GERD, thyroid disease, obstructive sleep apnea, anemia.  Her mom had hyperlipidemia, TIA and hypertension. Her sister has hyperlipidemia and hypertension. Her brother has hyperlipidemia and 3 of her brothers have hypertension. She is , has 2 children 1 who is still alive and a son who  in a motor vehicle accident. She is retired, has never smoked, has 3 red wine's per week and 2 cups of coffee per day.     History of Present Illness  ABIs done in 2023 were normal.      Echocardiogram 2024 EF 69%, mild concentric LVH, moderate to severe left atrial enlargement, G2 DD, calcified aortic valve without stenosis, trace mitral and tricuspid insufficiency.      She saw Dr. Abreu 2024.  At that time she had had uncontrolled blood pressures, fatigue, several months of right lower extremity swelling.  Swelling on exam appeared to be lymphedema.  Venous duplex was ordered and a referral to lymphedema clinic was placed.  Carotid bruit was noted and a carotid duplex was also ordered.    Bilateral carotid ultrasound done 2024 showed torturous right carotid artery with plaque present and less than 50% stenosis, a tortuous left carotid artery with no plaque and less than 50% stenosis.    10/2/2024 she had a normal bilateral lower extremity venous duplex scan.     2024 she called the office complaining of lightheadedness, dizziness, poor energy.  She took a home blood pressure that had low diastolic in the 50s and she had losartan.    Today Sheela tells me she just has not felt like herself for couple  months.  There has been dizziness, fatigue, weakness, and she has been concerned with low diastolic blood pressure readings.  She has not been on her losartan since December 2.  Her symptoms really have not changed.  There is no chest pain or pressure.  She denies SERNA.  She has not had any presyncope/syncope.  There is no heart racing or palpitations.  She has chronic lower extremity edema.  She has been to the lymphedema clinic she really did not feel like anything done there has helped.      I did check orthostatics in the office today.  When she was lying for 5 minutes 110/60, standing for 1 minute 145/80, standing for 3 minutes 150/90.  She was not tachycardic.  She did not have any symptoms.    Current Outpatient Medications on File Prior to Visit   Medication Sig Dispense Refill    atorvastatin (LIPITOR) 20 MG tablet TAKE ONE TABLET BY MOUTH DAILY **discontinue rosuvastatin** 90 tablet 2    Cholecalciferol (Vitamin D3) 75 MCG (3000 UT) tablet Take  by mouth.      estradiol (ESTRACE) 0.1 MG/GM vaginal cream INSERT 2G INTO THE VAGINA DAILY 42.5 g 2    gabapentin (NEURONTIN) 100 MG capsule Take 1 capsule by mouth Every Night. 30 capsule 0    levothyroxine (SYNTHROID, LEVOTHROID) 112 MCG tablet TAKE ONE TABLET BY MOUTH DAILY 180 tablet 0    losartan (Cozaar) 50 MG tablet Take 1 tablet by mouth Every Night. 90 tablet 3    Lyllana 0.0375 MG/24HR patch Place 1 patch on the skin as directed by provider 2 (Two) Times a Week.      Omeprazole 20 MG Tablet Delayed Release Dispersible   Oral, Daily, 0 Refill(s)      sertraline (ZOLOFT) 100 MG tablet Take 1 tablet by mouth Daily. 90 tablet 2    Zinc Sulfate 220 (50 Zn) MG tablet Take  by mouth.       No current facility-administered medications on file prior to visit.         Procedures    ECG 12 Lead    Date/Time: 12/11/2024 2:06 PM  Performed by: Lu Lugo APRN    Authorized by: Lu Lugo APRN  Comparison: compared with previous ECG from  "6/26/2024  Rhythm: sinus rhythm  BPM: 56              Objective:      Vitals:    12/11/24 1123   BP: 116/66   BP Location: Left arm   Patient Position: Sitting   Cuff Size: Adult   Pulse: 56   SpO2: 96%   Weight: 68.9 kg (152 lb)   Height: 174 cm (68.5\")     Body mass index is 22.78 kg/m².  Wt Readings from Last 3 Encounters:   12/11/24 68.9 kg (152 lb)   12/04/24 71.3 kg (157 lb 3.2 oz)   09/04/24 71.8 kg (158 lb 3.2 oz)         Constitutional:       Appearance: Healthy appearance. Not in distress.   Eyes:      Pupils: Pupils are equal, round, and reactive to light.   Pulmonary:      Effort: Pulmonary effort is normal.      Breath sounds: Normal breath sounds.   Chest:      Chest wall: Not tender to palpatation.   Cardiovascular:      Normal rate. Regular rhythm.      Murmurs: There is no murmur.   Pulses:     Radial: 2+ bilaterally.     Dorsalis pedis: 2+ bilaterally.     Posterior tibial: 2+ bilaterally.  Edema:     Peripheral edema absent.   Abdominal:      General: Bowel sounds are normal.      Palpations: Abdomen is soft.   Musculoskeletal: Normal range of motion.      Cervical back: Normal range of motion. Skin:     General: Skin is warm and dry.   Neurological:      Mental Status: Alert and oriented to person, place and time.         Lab Review:      Lab Results   Component Value Date    TSH 1.750 12/04/2024       Lab Results   Component Value Date    CHLPL 264 (H) 05/23/2024    CHLPL 174 10/04/2023    CHLPL 271 (H) 03/30/2023     Lab Results   Component Value Date    TRIG 90 05/23/2024    TRIG 70 10/04/2023    TRIG 125 03/30/2023     Lab Results   Component Value Date    HDL 94 (H) 05/23/2024    HDL 87 (H) 10/04/2023    HDL 93 03/30/2023     Lab Results   Component Value Date     (H) 05/23/2024    LDL 74 10/04/2023     (H) 03/30/2023       Lab Results   Component Value Date    WBC 5.4 12/04/2024    HGB 12.7 12/04/2024    HCT 38.3 12/04/2024    MCV 93 12/04/2024     12/04/2024       Lab " Results   Component Value Date    GLUCOSE 92 2024    BUN 16 2024    CREATININE 0.72 2024    EGFRRESULT 84 2024    EGFR >60 2014    BCR 22 2024    K 4.2 2024    CO2 23 2024    CALCIUM 9.2 2024    PROTENTOTREF 6.4 10/04/2023    ALBUMIN 4.5 10/04/2023    BILITOT 0.7 10/04/2023    AST 21 10/04/2023    ALT 14 10/04/2023       Lab Results   Component Value Date    HGBA1C 6.2 (H) 2023       Assessment:     1. Dizziness    2. Other hyperlipidemia    3. Hypertension, unspecified type    4. Diastolic dysfunction      Dizziness, fatigue, for 2 months of unknown etiology. ECG with no acute ischemia, no chest pain. No prior ischemic evaluation.   Hyperlipidemia on atorvastatin  Hypertension this is labile.    Diastolic dysfunction  Chronic lower extremity edema  GERD  OMAYRA  Hypothyroidism, on levothyroxine.  Carotid artery plaque without significant stenosis    Plan:   No medication changes were made  I discussed stress testing with her today, her   suddenly after a stress test in his 40s and she is very hesitant to do this. We are going to order a CT calcium score instead.  For her dizziness I have also ordered a Holter monitor.   Consider changing losartan to alternate agent-will review with Dr. Abreu and let her know recommendations.   Return in about 4 weeks (around 2025) for Lu Lugo.    Thank you for allowing me to participate in this patient's care. Please call with any questions or concerns.          Dragon dictation device was utilized in this note.

## 2024-12-12 ENCOUNTER — TELEPHONE (OUTPATIENT)
Dept: CARDIOLOGY | Facility: CLINIC | Age: 79
End: 2024-12-12

## 2024-12-12 NOTE — TELEPHONE ENCOUNTER
I would not start aspirin until we see result of  CT scan  If there is not cad it would not be beneficial as daily aspirin can increase risk of GI bleed  Thanks

## 2024-12-12 NOTE — TELEPHONE ENCOUNTER
Caller: Sheela Soto    Relationship: Self    Best call back number: [phone]    What are your concerns: PT SAID HER FAMILY HAS TOLD HER THAT SHE NEEDS TO TAKE A BABY ASPIRIN BEFORE HAVING HOLTER MONITOR APPT ON 1/14/25. PLEASE ADVISE

## 2024-12-13 ENCOUNTER — TELEPHONE (OUTPATIENT)
Dept: CARDIOLOGY | Facility: CLINIC | Age: 79
End: 2024-12-13
Payer: MEDICARE

## 2024-12-13 DIAGNOSIS — R42 DIZZINESS: Primary | ICD-10-CM

## 2024-12-13 DIAGNOSIS — R94.30 ABNORMAL RESULT OF CARDIOVASCULAR FUNCTION STUDY, UNSPECIFIED: ICD-10-CM

## 2024-12-13 DIAGNOSIS — R94.39 ABNORMAL RESULT OF OTHER CARDIOVASCULAR FUNCTION STUDY: ICD-10-CM

## 2024-12-13 RX ORDER — METOPROLOL TARTRATE 50 MG
TABLET ORAL
Qty: 2 TABLET | Refills: 0 | Status: SHIPPED | OUTPATIENT
Start: 2024-12-13 | End: 2024-12-13

## 2024-12-13 RX ORDER — AMLODIPINE BESYLATE 2.5 MG/1
2.5 TABLET ORAL DAILY
Qty: 90 TABLET | Refills: 3 | Status: SHIPPED | OUTPATIENT
Start: 2024-12-13

## 2024-12-13 NOTE — TELEPHONE ENCOUNTER
I spoke with Sheela Soto and gave them message from the provider.  They verbalized understanding.    Pt will  amlodipine and go to OP services on Monday for urinalysis.  I gave her the number to Schedule 1 ([phone]) to schedule CT chest.    If you don't want CCTA done, please cancel orders.  Do you still want her to have CT calcium score completed?        Thank you,    Chelsey BENEDICT RN  Triage LC  12/13/24 11:50 EST

## 2024-12-13 NOTE — TELEPHONE ENCOUNTER
Please let pt know I reviewed recent visit with Dr. Abreu    We would like to have her complete coronary CT instead of CT calcium score     I would like her to come in and complete urinalysis to rule out a UTI as this can cause atypical symptoms in patients over 65.     I am going to order a new blood pressure medication for her I would like her to start: amlodipine 2.5mg daily    Thanks

## 2024-12-13 NOTE — TELEPHONE ENCOUNTER
Cor cta not scheduling until February  Please cancel and have her complete CTA chest within the next 1-2 weeks  Thanks

## 2024-12-13 NOTE — TELEPHONE ENCOUNTER
"Lethargic, patient wants to be left alone. Refused all morning meds - stating she was too nauseous. Initially declined nausea meds, just wanted to be left alone. No PO intake today. Independent in room. Refused all morning meds upon additional offer a few hours later - declined and asked for nausea med.     Patient refused reviewing discharge instructions, stating \"I  already know everything.\" Port heparin flushed and de accessed. Writer walked patient to discharge pharmacy to  Oxycodone that was ready for pickup. Patient stated, \"Can I pick it up at my pharmacy?\" Writer explained that medication is ready for  and it would be a process to receive hard copy to fill at home pharmacy. Patient stated, \"I am too tired to wait and my mom is waiting outside. I will get it next time.\" Writer reiterated that she will not be able to fill this prescription elsewhere if she leaves without picking it up/receiving hard copy. Patient walked away and headed towards front door.    " I spoke with pt who was getting all 3 different CT scans ordered confused.  When she called scheduling, she had CT calcium score scheduled for 12/20/24.  She also has CT chest scheduled for 1/8/24.    I s/w , Mary Ann, and she has graciously cancelled calcium score testing.    Now that additional testing that is no longer needed isn't scheduled, unwanted testing can be cancelled.  Leaving the orders in the computer is going to cause further additional confusion with patient when trying to schedule testing.    Please delete CT calcium score order and CCTA order.    Thank you,    Chelsey BENEDICT , RN  Triage [facility]  12/13/24 13:22 EST

## 2024-12-13 NOTE — TELEPHONE ENCOUNTER
Patient called and stated that with all the new things going on, she is wanting to know if she needs to take a ASA 81 mg until all of the testing is done to help protect her?  She is concerned that she is not taking anything being afib was detected.  Please advise.    CB: [phone]    Sheron

## 2024-12-13 NOTE — TELEPHONE ENCOUNTER
I tried to call Sheela Soto but there was no answer.  Left a voicemail asking patient to call back.  Will continue to try to reach pt.    HUB- if pt calls back, please transfer through to triage.    Thank you,    Chelsey BENEDICT RN  Triage [facility]  12/13/24 08:56 EST

## 2024-12-13 NOTE — TELEPHONE ENCOUNTER
I spoke with Sheela Soto and gave them message from the provider.  They verbalized understanding & have no further questions at this time.    Thank you,    Chelsey BENEDICT RN  Triage [facility]  12/13/24 11:46 EST

## 2024-12-16 ENCOUNTER — LAB (OUTPATIENT)
Dept: LAB | Facility: HOSPITAL | Age: 79
End: 2024-12-16
Payer: MEDICARE

## 2024-12-16 ENCOUNTER — TELEPHONE (OUTPATIENT)
Dept: CARDIOLOGY | Facility: CLINIC | Age: 79
End: 2024-12-16
Payer: MEDICARE

## 2024-12-16 DIAGNOSIS — R42 DIZZINESS: ICD-10-CM

## 2024-12-16 LAB
BACTERIA UR QL AUTO: NORMAL /HPF
BILIRUB UR QL STRIP: NEGATIVE
CLARITY UR: CLEAR
COLOR UR: YELLOW
GLUCOSE UR STRIP-MCNC: NEGATIVE MG/DL
HGB UR QL STRIP.AUTO: NEGATIVE
HYALINE CASTS UR QL AUTO: NORMAL /LPF
KETONES UR QL STRIP: NEGATIVE
LEUKOCYTE ESTERASE UR QL STRIP.AUTO: NEGATIVE
NITRITE UR QL STRIP: NEGATIVE
PH UR STRIP.AUTO: 6.5 [PH] (ref 5–8)
PROT UR QL STRIP: NEGATIVE
RBC # UR STRIP: NORMAL /HPF
REF LAB TEST METHOD: NORMAL
SP GR UR STRIP: 1.01 (ref 1–1.03)
SQUAMOUS #/AREA URNS HPF: NORMAL /HPF
UROBILINOGEN UR QL STRIP: NORMAL
WBC # UR STRIP: NORMAL /HPF

## 2024-12-16 PROCEDURE — 81001 URINALYSIS AUTO W/SCOPE: CPT

## 2024-12-16 RX ORDER — ASPIRIN 81 MG/1
81 TABLET ORAL DAILY
COMMUNITY

## 2024-12-16 NOTE — TELEPHONE ENCOUNTER
Called and left VM, will continue to try to reach pt.    HUB- please put patient straight through to triage    Carla Carroll, RN  Triage RN  12/16/24 14:10 EST

## 2024-12-17 NOTE — TELEPHONE ENCOUNTER
Results called to pt.  Instructed to call with any further questions or concerns.  Verbalized understanding.      Lu- while I called her the UA results, pt mentioned that she had a metoprolol prescription that was just a few pills.  I reviewed the recent telephone encounters, and it appears that this was for a coronary CTA that was ordered, and then changed to a CTA chest.  I just wanted to clarify that this was what you wanted, as it looks like it is still not scheduled (and that you wanted it within 1-2 weeks).  Also, there is still an order for CT cardiac calcium score.  I'm happy to give her schedule one's number to schedule the proper test, but I'm afraid there might be confusion with multiple orders in the system.    Carla Carroll, RN  Triage Nurse, [facility]  12/17/24 09:31 EST

## 2024-12-19 NOTE — TELEPHONE ENCOUNTER
She needs to complete a CTA chest in the next one week  Cancel coronary ct and cancel calcium score

## 2024-12-19 NOTE — TELEPHONE ENCOUNTER
Pt called back. Gave her the number for Schedule One with instructions about what to say to get her CT of the chest scheduled. She verbalized understanding.    Thank you,    Susi Hand, RN  Triage [facility]  12/19/24 10:15 EST

## 2024-12-19 NOTE — TELEPHONE ENCOUNTER
Yuli was able to schedule pt's CT for tomorrow.    Thank you,    Susi Hand, RN  Triage [facility]  12/19/24 11:13 EST

## 2024-12-19 NOTE — TELEPHONE ENCOUNTER
Called and left VM, will continue to try to reach pt.    HUB- please put patient straight through to triage    Triage- per Lu's message, pt needs to schedule CTA chest in the next week.  She does not need the coronary CTA or the calcium score, so she can get rid of the metoprolol that was prescribed for the coronary CTA.     Carla Carroll, RN  Triage RN  12/19/24 08:39 EST

## 2024-12-20 ENCOUNTER — HOSPITAL ENCOUNTER (OUTPATIENT)
Dept: CT IMAGING | Facility: HOSPITAL | Age: 79
Discharge: HOME | End: 2024-12-20
Payer: MEDICARE

## 2024-12-20 ENCOUNTER — TELEPHONE (OUTPATIENT)
Dept: CARDIOLOGY | Facility: CLINIC | Age: 79
End: 2024-12-20
Payer: MEDICARE

## 2024-12-20 DIAGNOSIS — I10 PRIMARY HYPERTENSION: ICD-10-CM

## 2024-12-20 DIAGNOSIS — R94.30 ABNORMAL RESULT OF CARDIOVASCULAR FUNCTION STUDY, UNSPECIFIED: ICD-10-CM

## 2024-12-20 DIAGNOSIS — I51.89 DIASTOLIC DYSFUNCTION: ICD-10-CM

## 2024-12-20 DIAGNOSIS — R42 DIZZINESS: Primary | ICD-10-CM

## 2024-12-20 DIAGNOSIS — R94.39 ABNORMAL RESULT OF OTHER CARDIOVASCULAR FUNCTION STUDY: ICD-10-CM

## 2024-12-20 DIAGNOSIS — E78.2 MIXED HYPERLIPIDEMIA: ICD-10-CM

## 2024-12-20 PROCEDURE — 25510000001 IOPAMIDOL PER 1 ML: Performed by: FAMILY MEDICINE

## 2024-12-20 PROCEDURE — 71275 CT ANGIOGRAPHY CHEST: CPT

## 2024-12-20 RX ORDER — METOPROLOL TARTRATE 50 MG
TABLET ORAL
Qty: 2 TABLET | Refills: 0 | Status: SHIPPED | OUTPATIENT
Start: 2024-12-20

## 2024-12-20 RX ORDER — IOPAMIDOL 755 MG/ML
100 INJECTION, SOLUTION INTRAVASCULAR
Status: COMPLETED | OUTPATIENT
Start: 2024-12-20 | End: 2024-12-20

## 2024-12-20 RX ADMIN — IOPAMIDOL 95 ML: 755 INJECTION, SOLUTION INTRAVENOUS at 07:50

## 2024-12-20 NOTE — NURSING NOTE
Pt here for a stat CTA PE chest protocol.     0830-Dr Martínez called and stated that pt may go home. Negative or NO PE.       0840-Pt ambulated self out to go home.

## 2024-12-20 NOTE — TELEPHONE ENCOUNTER
Reviewed CT chest with Dr. Abreu  Symptoms may be due to her large hiatal hernia  At this point completion of a coronary CTA Is still recommended as well and I have placed order for this to be done.

## 2025-01-02 ENCOUNTER — OFFICE VISIT (OUTPATIENT)
Dept: INTERNAL MEDICINE | Facility: CLINIC | Age: 80
End: 2025-01-02
Payer: MEDICARE

## 2025-01-02 VITALS
WEIGHT: 156.4 LBS | DIASTOLIC BLOOD PRESSURE: 82 MMHG | HEIGHT: 69 IN | HEART RATE: 59 BPM | BODY MASS INDEX: 23.16 KG/M2 | SYSTOLIC BLOOD PRESSURE: 174 MMHG | OXYGEN SATURATION: 99 %

## 2025-01-02 DIAGNOSIS — I10 PRIMARY HYPERTENSION: Primary | ICD-10-CM

## 2025-01-02 DIAGNOSIS — F33.42 RECURRENT MAJOR DEPRESSIVE DISORDER, IN FULL REMISSION: ICD-10-CM

## 2025-01-02 DIAGNOSIS — G25.81 RESTLESS LEG SYNDROME: ICD-10-CM

## 2025-01-02 PROCEDURE — 3079F DIAST BP 80-89 MM HG: CPT | Performed by: STUDENT IN AN ORGANIZED HEALTH CARE EDUCATION/TRAINING PROGRAM

## 2025-01-02 PROCEDURE — 99214 OFFICE O/P EST MOD 30 MIN: CPT | Performed by: STUDENT IN AN ORGANIZED HEALTH CARE EDUCATION/TRAINING PROGRAM

## 2025-01-02 PROCEDURE — 3077F SYST BP >= 140 MM HG: CPT | Performed by: STUDENT IN AN ORGANIZED HEALTH CARE EDUCATION/TRAINING PROGRAM

## 2025-01-02 NOTE — PROGRESS NOTES
Stefan Valencia M.D.  Internal Medicine  [facility]  [address], [address]  [place] [address]  [phone]      Chief Complaint  Hypertension (4 week F/U /)    SUBJECTIVE    History of Present Illness    Sheela Soto is a 80 y.o. female who presents to the office today as an established patient that last saw me on 12/4/2024.     Her birthday was [birth year]    Last appointment she expressed interest in discontinuing Zoloft.  She has been on Zoloft for over 20 years.    At recent appointment she was started on gabapentin for restless leg syndrome.  Previously she was on ropinirole without relief.    At last appointment she was advised to discontinue losartan.  Blood pressure had been low with lightheadedness.  She followed up with cardiology.  Cardiology advised a stress test but she was hesitant because her  passed away following a stress test.  Her cardiologist ordered CT angiogram for ischemic workup and Holter monitor for dizziness.    States she is having trouble keeping up with appointmennts.     She had CTA recently and it showed No evidence for pulmonary thromboembolic disease.  She had cluster of micronodular opacities within the right upper lobe likely inflammatory or infectious and also 5 mm pleural-based noncalcified nodule right lower lobe .  Patient has never smoked.  Large hiatal hernia with adjacent compressive lower lobe atelectasis.    She also had mild coronary arterial calcifications. Calcification within the left  proximal vertebral artery with mild narrowing. Mild narrowing origin of  the left subclavian artery.  Calcium score not obtained.  Now on 81 mg ASA    Gabapentin is helping RLS. Has not started iron.     HTN-not taking anyything for blood pressure now. Prescribed losartan and amlodipine.  Blood pressure very elevated today.  She denies chest pain, pressure, shortness of breath, she has chronic lymphedema.        Review of Systems    Allergies    Allergen Reactions    Codeine Nausea Only    Latex Rash     Doesn't like to wear latex gloves          Outpatient Medications Marked as Taking for the 25 encounter (Office Visit) with Stefan Valencia MD   Medication Sig Dispense Refill    amLODIPine (NORVASC) 2.5 MG tablet Take 1 tablet by mouth Daily. 90 tablet 3    aspirin 81 MG EC tablet Take 1 tablet by mouth Daily.      atorvastatin (LIPITOR) 20 MG tablet TAKE ONE TABLET BY MOUTH DAILY **discontinue rosuvastatin** 90 tablet 2    Cholecalciferol (Vitamin D3) 75 MCG (3000 UT) tablet Take  by mouth.      estradiol (ESTRACE) 0.1 MG/GM vaginal cream INSERT 2G INTO THE VAGINA DAILY 42.5 g 2    gabapentin (NEURONTIN) 100 MG capsule Take 1 capsule by mouth Every Night. 30 capsule 0    levothyroxine (SYNTHROID, LEVOTHROID) 112 MCG tablet TAKE ONE TABLET BY MOUTH DAILY 180 tablet 0    Lyllana 0.0375 MG/24HR patch Place 1 patch on the skin as directed by provider 2 (Two) Times a Week.      Omeprazole 20 MG Tablet Delayed Release Dispersible   Oral, Daily, 0 Refill(s)      sertraline (ZOLOFT) 100 MG tablet Take 1 tablet by mouth Daily. 90 tablet 2    Zinc Sulfate 220 (50 Zn) MG tablet Take  by mouth.      [DISCONTINUED] losartan (Cozaar) 50 MG tablet Take 1 tablet by mouth Every Night. 90 tablet 3    [DISCONTINUED] metoprolol tartrate (LOPRESSOR) 50 MG tablet Take 50 mg at Bedtime the Night Before Coronary CTA Appointment and In the Morning 1 Hour Prior to Coronary CTA Appointment. Do not take if heart rate less than 60. 2 tablet 0        Past Medical History:   Diagnosis Date    Anemia 3/30/2023    Had never been told this before…  put on iron prescription every other  day.day,    Anxiety , son killed in a motorcycle accident, put on zoloft.    My Son , age 27    Arthritis     This was noticeable in knees , had both knees replaced,     Cataract Years ago, surgeries       Kept watching them, had both eye surgeries. August and 2023.     Depression  my  27 year old son .    Zoloft. Helps me stay calm and not sad.    GERD (gastroesophageal reflux disease) 2006    20 mg Omeprazole  just 10 mg’s daily works perfect    History of medical problems     Sleep Apnea. Use machine nightly    Hyperlipidemia     Hypertension     Hypothyroidism     Neuromuscular disorder Around     Restless Legs, both, on Prescription OpiniRole,  helps some…    Sleep apnea     Urinary tract infection     UTI Dr. Laura Fuller, on Estradiol cream, 0.015%    Visual impairment Age 8,      Wear readers now only.  Had both eyes cataract surgeries .     Past Surgical History:   Procedure Laterality Date    ADENOIDECTOMY      Adnoids? & tonsils as an 7 yr. Old    COLONOSCOPY  2005    Only once no problems    EYE SURGERY  August & Sept.     Cataract surgeries both eyes 2 weeks apart.    HYSTERECTOMY      JOINT REPLACEMENT   toes.    Knees    Both knees replaced, toes operated on,    REPLACEMENT TOTAL KNEE BILATERAL      TONSILLECTOMY      As an 7 year old child.      TUBAL ABDOMINAL LIGATION  1988    No problems     Family History   Problem Relation Age of Onset    Pneumonia Mother     Depression Mother         Depression runs in my family on both sides.    Hyperlipidemia Mother     Hypertension Mother         All five of us, me and 4 siblings have  high blood pressure.    Miscarriages / Stillbirths Mother         She had miscarriages.    Vision loss Mother         Had Glaucoma later in life,  late 60’s.    Transient ischemic attack Mother     Hyperlipidemia Sister     Hypertension Sister         Age 77    Kidney disease Sister         Under a Kidney Specialist.    Hyperlipidemia Brother     Hyperlipidemia Brother     Hypertension Brother         Age 72    Hyperlipidemia Brother     Hypertension Brother         Age 70    Hyperlipidemia Brother     Hypertension Brother         Age 66    Autism Other     reports that she has never smoked. She  "has never been exposed to tobacco smoke. She has never used smokeless tobacco. She reports current alcohol use of about 3.0 standard drinks of alcohol per week. She reports that she does not use drugs.    OBJECTIVE    Vital Signs:   /82   Pulse 59   Ht 174 cm (68.5\")   Wt 70.9 kg (156 lb 6.4 oz)   SpO2 99%   BMI 23.43 kg/m²     Physical Exam  Constitutional:       Appearance: Normal appearance.   Cardiovascular:      Rate and Rhythm: Normal rate and regular rhythm.      Heart sounds: Normal heart sounds. No murmur heard.  Pulmonary:      Effort: Pulmonary effort is normal.      Breath sounds: Normal breath sounds.   Skin:     General: Skin is warm and dry.   Neurological:      Mental Status: She is alert.   Psychiatric:         Mood and Affect: Mood normal.         Behavior: Behavior normal.         Thought Content: Thought content normal.            The following data was reviewed by: Stefan Valencia MD on 01/02/2025:  CMP          5/23/2024    10:53 6/25/2024    10:03 12/4/2024    10:23   CMP   Glucose 89  95  92    BUN 10  16  16    Creatinine 0.58  0.65  0.72    Sodium 137  139  141    Potassium 4.2  4.1  4.2    Chloride 99  101  105    Calcium 9.0  9.5  9.2    BUN/Creatinine Ratio 17.2  24.6  22      CBC w/diff          12/4/2024    10:23   CBC w/Diff   WBC 5.4    RBC 4.13    Hemoglobin 12.7    Hematocrit 38.3    MCV 93    MCH 30.8    MCHC 33.2    RDW 11.9    Platelets 247    Neutrophil Rel % 53    Lymphocyte Rel % 35    Monocyte Rel % 8    Eosinophil Rel % 3    Basophil Rel % 1      Lipid Panel          5/23/2024    10:53   Lipid Panel   Total Cholesterol 264    Triglycerides 90    HDL Cholesterol 94    VLDL Cholesterol 15    LDL Cholesterol  155    LDL/HDL Ratio 1.62      Electrolytes          5/23/2024    10:53 6/25/2024    10:03 12/4/2024    10:23   Electrolytes   Sodium 137  139  141    Potassium 4.2  4.1  4.2    Chloride 99  101  105    Calcium 9.0  9.5  9.2        Data reviewed : cardiology notes " and CT              ASSESSMENT & PLAN        Primary hypertension  Hypertension is uncontrolled  Not taking medication for blood pressure.  Advised patient to start amlodipine prescribed by her cardiologist.  She is somewhat overwhelmed with multiple appointments.  Advised her to follow-up with cardiology although I am happy to answer any concerns she has about hypertension.    CT angiogram pening. Adivsed patient this is what metoprol is for.      Recurrent major depressive disorder, in full remission  Discussed weaning zoloft today. Try at time of low stress. Wean Zoloft to 50 mg daily for 2 weeks then 50 mg every other day for 2 weeks then stop.  She is stressed about recent doctors visit so if now would probably not be a good time to wean off of Zoloft.         Restless leg syndrome  Restless leg symptoms much improved with gabapentin.Start iron since this may help with restless leg..         Low risk for lung cancer.  No smoking history.          Health Maintenance Due   Topic Date Due    TDAP/TD VACCINES (1 - Tdap) Never done    RSV Vaccine - Adults (1 - 1-dose 75+ series) Never done    INFLUENZA VACCINE  07/01/2024    COVID-19 Vaccine (5 - 2024-25 season) 09/01/2024    ANNUAL WELLNESS VISIT  10/04/2024        Follow Up  No follow-ups on file.    Patient/family had no further questions at this time and verbalized understanding of the plan discussed today.

## 2025-01-02 NOTE — ASSESSMENT & PLAN NOTE
Restless leg symptoms much improved with gabapentin.Start iron since this may help with restless leg..

## 2025-01-02 NOTE — ASSESSMENT & PLAN NOTE
Discussed weaning zoloft today. Try at time of low stress. Wean Zoloft to 50 mg daily for 2 weeks then 50 mg every other day for 2 weeks then stop.  She is stressed about recent doctors visit so if now would probably not be a good time to wean off of Zoloft.

## 2025-01-02 NOTE — PATIENT INSTRUCTIONS
Discussed weaning zoloft today. Try at time of low stress. Wean Zoloft to 50 mg daily for 2 weeks then 50 mg every other day for 2 weeks then stop.

## 2025-01-02 NOTE — ASSESSMENT & PLAN NOTE
Hypertension is uncontrolled  Not taking medication for blood pressure.  Advised patient to start amlodipine prescribed by her cardiologist.  She is somewhat overwhelmed with multiple appointments.  Advised her to follow-up with cardiology although I am happy to answer any concerns she has about hypertension.    CT angiogram pening. Adivsed patient this is what metoprol is for.

## 2025-01-03 DIAGNOSIS — G25.81 RESTLESS LEG SYNDROME: ICD-10-CM

## 2025-01-03 RX ORDER — GABAPENTIN 100 MG/1
100 CAPSULE ORAL EVERY EVENING
Qty: 30 CAPSULE | Refills: 0 | Status: SHIPPED | OUTPATIENT
Start: 2025-01-03

## 2025-01-07 ENCOUNTER — TELEPHONE (OUTPATIENT)
Dept: CARDIOLOGY | Facility: CLINIC | Age: 80
End: 2025-01-07
Payer: MEDICARE

## 2025-01-07 NOTE — TELEPHONE ENCOUNTER
I called her today.      Still not feeling well - feeling badly with lower BP and feels dyspnea.  May need a higher BP to feel better.      She does not want to do a lot testing if possible and wants to figure out if she feels better with her bp higher.   She felt better a year ago.  She has lost 30lbs. She is normally a very active person.     Her amlodipine was stopped due to low diastolic pressures but her Bp was 189/90 at Dr. Valencia office visit. She was placed back on amlodipine.  She does not feel well back on the medication.      Her blood pressure has been somewhat labile through the holidays-systolics in the 150s but diastolics in the 50s to 60s.    Advised that she stop amlodipine for now and monitor BP at home.  Bring BP readings to next appt as well as bp machine to check.     See me or jeanne 1 month. Sofy - can you Cancel zio monitor for now?     Please schedule - thx.

## 2025-01-24 ENCOUNTER — TELEPHONE (OUTPATIENT)
Dept: CARDIOLOGY | Facility: CLINIC | Age: 80
End: 2025-01-24
Payer: MEDICARE

## 2025-01-24 DIAGNOSIS — R42 DIZZINESS: Primary | ICD-10-CM

## 2025-01-27 ENCOUNTER — LAB (OUTPATIENT)
Dept: LAB | Facility: HOSPITAL | Age: 80
End: 2025-01-27
Payer: MEDICARE

## 2025-01-27 ENCOUNTER — TELEPHONE (OUTPATIENT)
Dept: CARDIOLOGY | Facility: CLINIC | Age: 80
End: 2025-01-27
Payer: MEDICARE

## 2025-01-27 DIAGNOSIS — R42 DIZZINESS: ICD-10-CM

## 2025-01-27 LAB
ANION GAP SERPL CALCULATED.3IONS-SCNC: 11.5 MMOL/L (ref 5–15)
BUN SERPL-MCNC: 13 MG/DL (ref 8–23)
BUN/CREAT SERPL: 19.1 (ref 7–25)
CALCIUM SPEC-SCNC: 9.2 MG/DL (ref 8.6–10.5)
CHLORIDE SERPL-SCNC: 105 MMOL/L (ref 98–107)
CO2 SERPL-SCNC: 24.5 MMOL/L (ref 22–29)
CREAT SERPL-MCNC: 0.68 MG/DL (ref 0.57–1)
EGFRCR SERPLBLD CKD-EPI 2021: 87.6 ML/MIN/1.73
GLUCOSE SERPL-MCNC: 86 MG/DL (ref 65–99)
POTASSIUM SERPL-SCNC: 4.2 MMOL/L (ref 3.5–5.2)
SODIUM SERPL-SCNC: 141 MMOL/L (ref 136–145)

## 2025-01-27 PROCEDURE — 36415 COLL VENOUS BLD VENIPUNCTURE: CPT

## 2025-01-27 PROCEDURE — 80048 BASIC METABOLIC PNL TOTAL CA: CPT

## 2025-01-27 NOTE — TELEPHONE ENCOUNTER
Reviewed results with Sheela Soto and patient verbalized understanding of results.    Thank you,  Sharmila SCHMIDT RN  Triage Nurse [facility]  01/27/25    13:16 EST

## 2025-01-29 ENCOUNTER — TELEPHONE (OUTPATIENT)
Dept: CARDIOLOGY | Facility: HOSPITAL | Age: 80
End: 2025-01-29

## 2025-01-29 DIAGNOSIS — G25.81 RESTLESS LEG SYNDROME: ICD-10-CM

## 2025-01-29 RX ORDER — GABAPENTIN 100 MG/1
100 CAPSULE ORAL EVERY EVENING
Qty: 90 CAPSULE | Refills: 0 | Status: SHIPPED | OUTPATIENT
Start: 2025-01-29

## 2025-01-29 NOTE — TELEPHONE ENCOUNTER
Spoke with patient to complete pre-CCTA checklist. PT does have Metoprolol 50 mg ordered and knows to take the night before and the morning of one hour before scan. PT knows to arrive 30 mins prior to scan. Labs were drawn on 1/27/25 and WNL.

## 2025-01-31 ENCOUNTER — HOSPITAL ENCOUNTER (OUTPATIENT)
Dept: CT IMAGING | Facility: HOSPITAL | Age: 80
Discharge: HOME | End: 2025-01-31
Payer: MEDICARE

## 2025-01-31 ENCOUNTER — HOSPITAL ENCOUNTER (OUTPATIENT)
Dept: CARDIOLOGY | Facility: HOSPITAL | Age: 80
Discharge: HOME | End: 2025-01-31
Payer: MEDICARE

## 2025-01-31 VITALS
OXYGEN SATURATION: 96 % | SYSTOLIC BLOOD PRESSURE: 160 MMHG | DIASTOLIC BLOOD PRESSURE: 74 MMHG | RESPIRATION RATE: 16 BRPM | HEART RATE: 54 BPM

## 2025-01-31 DIAGNOSIS — I10 PRIMARY HYPERTENSION: ICD-10-CM

## 2025-01-31 DIAGNOSIS — R42 DIZZINESS: ICD-10-CM

## 2025-01-31 DIAGNOSIS — I51.89 DIASTOLIC DYSFUNCTION: ICD-10-CM

## 2025-01-31 DIAGNOSIS — R94.30 ABNORMAL RESULT OF CARDIOVASCULAR FUNCTION STUDY, UNSPECIFIED: Primary | ICD-10-CM

## 2025-01-31 DIAGNOSIS — R94.30 ABNORMAL RESULT OF CARDIOVASCULAR FUNCTION STUDY, UNSPECIFIED: ICD-10-CM

## 2025-01-31 DIAGNOSIS — R94.39 ABNORMAL RESULT OF OTHER CARDIOVASCULAR FUNCTION STUDY: ICD-10-CM

## 2025-01-31 DIAGNOSIS — E78.2 MIXED HYPERLIPIDEMIA: ICD-10-CM

## 2025-01-31 PROCEDURE — 75574 CT ANGIO HRT W/3D IMAGE: CPT

## 2025-01-31 PROCEDURE — 25510000001 IOPAMIDOL PER 1 ML: Performed by: FAMILY MEDICINE

## 2025-01-31 PROCEDURE — 36415 COLL VENOUS BLD VENIPUNCTURE: CPT

## 2025-01-31 RX ORDER — IOPAMIDOL 755 MG/ML
100 INJECTION, SOLUTION INTRAVASCULAR
Status: COMPLETED | OUTPATIENT
Start: 2025-01-31 | End: 2025-01-31

## 2025-01-31 RX ORDER — NITROGLYCERIN 0.4 MG/1
0.8 TABLET SUBLINGUAL ONCE
Status: DISCONTINUED | OUTPATIENT
Start: 2025-01-31 | End: 2025-02-01 | Stop reason: HOSPADM

## 2025-01-31 RX ORDER — NITROGLYCERIN 0.4 MG/1
0.8 TABLET SUBLINGUAL
Status: DISCONTINUED | OUTPATIENT
Start: 2025-01-31 | End: 2025-02-01 | Stop reason: HOSPADM

## 2025-01-31 RX ORDER — SODIUM CHLORIDE 0.9 % (FLUSH) 0.9 %
10 SYRINGE (ML) INJECTION AS NEEDED
Status: DISCONTINUED | OUTPATIENT
Start: 2025-01-31 | End: 2025-02-01 | Stop reason: HOSPADM

## 2025-01-31 RX ORDER — SODIUM CHLORIDE 0.9 % (FLUSH) 0.9 %
10 SYRINGE (ML) INJECTION EVERY 12 HOURS SCHEDULED
Status: DISCONTINUED | OUTPATIENT
Start: 2025-01-31 | End: 2025-02-01 | Stop reason: HOSPADM

## 2025-01-31 RX ADMIN — IOPAMIDOL 100 ML: 755 INJECTION, SOLUTION INTRAVENOUS at 09:29

## 2025-01-31 NOTE — PROGRESS NOTES
Patient verified taking her Metoprolol 50mg po last night. Did not take 2nd dose this am. HR 54. Consent signed, no questions at this time.- Ara FRANKS RN

## 2025-02-01 DIAGNOSIS — G25.81 RESTLESS LEG SYNDROME: ICD-10-CM

## 2025-02-03 ENCOUNTER — TELEPHONE (OUTPATIENT)
Dept: CARDIOLOGY | Facility: CLINIC | Age: 80
End: 2025-02-03
Payer: MEDICARE

## 2025-02-03 RX ORDER — ATORVASTATIN CALCIUM 20 MG/1
40 TABLET, FILM COATED ORAL NIGHTLY
Qty: 90 TABLET | Refills: 2 | Status: SHIPPED | OUTPATIENT
Start: 2025-02-03 | End: 2025-02-05 | Stop reason: SDUPTHER

## 2025-02-03 NOTE — TELEPHONE ENCOUNTER
Coronary CTA showing a mild amount of plaque  Continue daily 81 mg aspirin   and atorvastatin- increased to 40 mg    Noncardiac finding on CT of large hiatal hernia reported stable from previous imaging.  Would review with PCP.     Thanks

## 2025-02-04 RX ORDER — ROPINIROLE 2 MG/1
2 TABLET, FILM COATED ORAL 2 TIMES DAILY
Qty: 180 TABLET | Refills: 0 | OUTPATIENT
Start: 2025-02-04

## 2025-02-04 NOTE — TELEPHONE ENCOUNTER
Notified patient of results and recommendations; patient verbalizes understanding.    [facility] Triage  02/04/25 08:34 EST

## 2025-02-05 RX ORDER — ATORVASTATIN CALCIUM 40 MG/1
40 TABLET, FILM COATED ORAL NIGHTLY
Qty: 90 TABLET | Refills: 1 | Status: SHIPPED | OUTPATIENT
Start: 2025-02-05

## 2025-02-11 ENCOUNTER — OFFICE VISIT (OUTPATIENT)
Dept: CARDIOLOGY | Facility: CLINIC | Age: 80
End: 2025-02-11
Payer: MEDICARE

## 2025-02-11 VITALS
SYSTOLIC BLOOD PRESSURE: 138 MMHG | WEIGHT: 157 LBS | DIASTOLIC BLOOD PRESSURE: 78 MMHG | HEIGHT: 69 IN | HEART RATE: 71 BPM | BODY MASS INDEX: 23.25 KG/M2

## 2025-02-11 DIAGNOSIS — R42 DIZZINESS: ICD-10-CM

## 2025-02-11 DIAGNOSIS — I51.89 DIASTOLIC DYSFUNCTION: ICD-10-CM

## 2025-02-11 DIAGNOSIS — I25.10 MILD CAD: Primary | ICD-10-CM

## 2025-02-11 DIAGNOSIS — E78.2 MIXED HYPERLIPIDEMIA: ICD-10-CM

## 2025-02-11 DIAGNOSIS — I10 PRIMARY HYPERTENSION: ICD-10-CM

## 2025-02-11 NOTE — PROGRESS NOTES
Date of Office Visit: 2025  Encounter Provider: BRIANA Bell  Place of Service: [facility] CARDIOLOGY  Established cardiologist: Ginny Abreu MD  Patient Name: Sheela oSto  :1945      Patient ID:  Sheela Soto is a 80 y.o. female is here for  followup    With a pertinent medical history of hypertension, hyperlipidemia, GERD, thyroid disease, obstructive sleep apnea, anemia.  Her mom had hyperlipidemia, TIA and hypertension. Her sister has hyperlipidemia and hypertension. Her brother has hyperlipidemia and 3 of her brothers have hypertension. She is , has 2 children 1 who is still alive and a son who  in a motor vehicle accident. She is retired, has never smoked, has 3 red wine's per week and 2 cups of coffee per day.     History of Present Illness  ABIs done in 2023 were normal.       Echocardiogram 2024 EF 69%, mild concentric LVH, moderate to severe left atrial enlargement, G2 DD, calcified aortic valve without stenosis, trace mitral and tricuspid insufficiency.       She saw Dr. Abreu 2024.  At that time she had had uncontrolled blood pressures, fatigue, several months of right lower extremity swelling.  Swelling on exam appeared to be lymphedema.  Venous duplex was ordered and a referral to lymphedema clinic was placed.  Carotid bruit was noted and a carotid duplex was also ordered.     Bilateral carotid ultrasound done 2024 showed torturous right carotid artery with plaque present and less than 50% stenosis, a tortuous left carotid artery with no plaque and less than 50% stenosis.     10/2/2024 she had a normal bilateral lower extremity venous duplex scan.      2024 she called the office complaining of lightheadedness, dizziness, poor energy.  She took a home blood pressure that had low diastolic in the 50s and she had losartan.    Coronary CTA was done 2024 and this showed a calcium score of  36 distributed 35 in the LAD 1 RCA.  This showed mild one-vessel disease.  Mild sclerotic changes of the aortic valve.  Medical therapy was recommended.  Noncardiac CT findings showed a very large hiatal hernia.     1/7/2025 she called the office with reports of fatigue and low diastolic blood pressure.  She had stopped her amlodipine.  It was then very resumed and she did not feel well on the medicine.  Dr. Abreu advised to stop amlodipine and monitor blood pressure at home and bring in those readings to her follow-up.  Order for Zio monitor was canceled for the time being.    Today Sheela is feeling well.  She has brought in home blood pressure readings these really have been fairly well-controlled and they are a little bit labile but no significant highs or lows.  The average readings are 115 systolic-150 systolic over 48 diastolic-75 diastolic.  Symptomatically she has been feeling much better.  There is still been some lightheadedness and she is wondering if it is from sertraline.  That if from a cardiac standpoint were not going to make any adjustments or testing further for this symptom PCP is going to start lowering her sertraline dose slowly.  There is no chest pain or pressure, SERNA, PND, presyncope/syncope, heart racing, palpitations.  There is chronic lower extremity edema related to lymphedema and this is stable and unchanged to her today.      Current Outpatient Medications on File Prior to Visit   Medication Sig Dispense Refill    aspirin 81 MG EC tablet Take 1 tablet by mouth Daily.      atorvastatin (LIPITOR) 40 MG tablet Take 1 tablet by mouth Every Night. 90 tablet 1    Cholecalciferol (Vitamin D3) 75 MCG (3000 UT) tablet Take  by mouth.      estradiol (ESTRACE) 0.1 MG/GM vaginal cream INSERT 2G INTO THE VAGINA DAILY 42.5 g 2    gabapentin (NEURONTIN) 100 MG capsule TAKE ONE CAPSULE BY MOUTH EVERY EVENING 90 capsule 0    levothyroxine (SYNTHROID, LEVOTHROID) 112 MCG tablet TAKE ONE TABLET BY  "MOUTH DAILY 180 tablet 0    Lyllana 0.0375 MG/24HR patch Place 1 patch on the skin as directed by provider 2 (Two) Times a Week.      Omeprazole 20 MG Tablet Delayed Release Dispersible   Oral, Daily, 0 Refill(s)      sertraline (ZOLOFT) 100 MG tablet Take 1 tablet by mouth Daily. 90 tablet 2    Zinc Sulfate 220 (50 Zn) MG tablet Take  by mouth.      [DISCONTINUED] amLODIPine (NORVASC) 2.5 MG tablet Take 1 tablet by mouth Daily. (Patient not taking: Reported on 2/11/2025) 90 tablet 3     No current facility-administered medications on file prior to visit.         Procedures    ECG 12 Lead    Date/Time: 2/11/2025 3:24 PM  Performed by: Lu Lugo APRN    Authorized by: Lu Lugo APRN  Comparison: compared with previous ECG from 12/11/2024  Rhythm: sinus rhythm  Rate: normal  BPM: 71  QRS axis: normal            Objective:      Vitals:    02/11/25 1446   BP: 138/78   BP Location: Left arm   Patient Position: Sitting   Cuff Size: Adult   Pulse: 71   Weight: 71.2 kg (157 lb)   Height: 174 cm (68.5\")     Body mass index is 23.52 kg/m².  Wt Readings from Last 3 Encounters:   02/11/25 71.2 kg (157 lb)   01/02/25 70.9 kg (156 lb 6.4 oz)   12/11/24 68.9 kg (152 lb)     Constitutional:       Appearance: Healthy appearance. Not in distress.   Eyes:      Pupils: Pupils are equal, round, and reactive to light.   Pulmonary:      Effort: Pulmonary effort is normal.      Breath sounds: Normal breath sounds.   Cardiovascular:      Normal rate. Regular rhythm.      Murmurs: There is no murmur.   Pulses:     Radial: 2+ bilaterally.     Dorsalis pedis: 2+ bilaterally.     Posterior tibial: 2+ bilaterally.  Edema:     Comments: Bilateral nonpitting edema is present  Musculoskeletal:      Cervical back: Normal range of motion. Skin:     General: Skin is warm and dry.   Neurological:      Mental Status: Alert and oriented to person, place and time.   Psychiatric:         Speech: Speech normal.       Lab Review: "   1/27/2025 she completed an unremarkable BMP.  12/4/2024 TSH was 1.75,CBC was unremarkable, BMP was unremarkable.  Lipid panel 5/23/2024 total cholesterol 264, TG 90, HDL 94, .     Assessment:     1. Mild CAD    2. Primary hypertension    3. Mixed hyperlipidemia    4. Diastolic dysfunction    5. Dizziness    Mild 1V CAD on coronary CTA December 2024.  She has no angina.  She continues on ASA, statin  Hypertension, labile, fairly well-controlled on no medicines.  Off amlodipine 2.5 mg and for now remain off of this.   Hyperlipidemia on statin therapy  Diastolic dysfunction  Dizziness, etiology not known.  She is next going to work with PCP to wean sertraline and see if this improves.   Hypothyroidism on levothyroxine  Chronic lower extremity edema/lymphedema this is stable  GERD on omeprazole  Carotid artery plaques without significant stenosis    Plan:   No medication changes were made  She is going to continue blood pressure monitoring at home.  We reviewed reportable parameters today.  I will see her in about 12 weeks to see how she is doing weaning down off sertraline and if dizziness has resolved and blood pressure monitoring.   Return in about 12 weeks (around 5/6/2025) for Lu Lugo.        Thank you for allowing me to participate in this patient's care. Please call with any questions or concerns.          Dragon dictation device was utilized in this note.

## 2025-03-13 ENCOUNTER — OFFICE VISIT (OUTPATIENT)
Dept: INTERNAL MEDICINE | Facility: CLINIC | Age: 80
End: 2025-03-13
Payer: MEDICARE

## 2025-03-13 VITALS
SYSTOLIC BLOOD PRESSURE: 172 MMHG | WEIGHT: 155.6 LBS | HEART RATE: 78 BPM | HEIGHT: 69 IN | OXYGEN SATURATION: 97 % | BODY MASS INDEX: 23.05 KG/M2 | DIASTOLIC BLOOD PRESSURE: 92 MMHG

## 2025-03-13 DIAGNOSIS — I95.1 ORTHOSTATIC HYPOTENSION: Primary | ICD-10-CM

## 2025-03-13 DIAGNOSIS — I10 PRIMARY HYPERTENSION: ICD-10-CM

## 2025-03-13 DIAGNOSIS — R42 DIZZINESS: ICD-10-CM

## 2025-03-13 DIAGNOSIS — R41.0 CONFUSION: ICD-10-CM

## 2025-03-13 DIAGNOSIS — F33.42 RECURRENT MAJOR DEPRESSIVE DISORDER, IN FULL REMISSION: ICD-10-CM

## 2025-03-13 PROCEDURE — 99214 OFFICE O/P EST MOD 30 MIN: CPT | Performed by: STUDENT IN AN ORGANIZED HEALTH CARE EDUCATION/TRAINING PROGRAM

## 2025-03-13 PROCEDURE — 3077F SYST BP >= 140 MM HG: CPT | Performed by: STUDENT IN AN ORGANIZED HEALTH CARE EDUCATION/TRAINING PROGRAM

## 2025-03-13 PROCEDURE — 3080F DIAST BP >= 90 MM HG: CPT | Performed by: STUDENT IN AN ORGANIZED HEALTH CARE EDUCATION/TRAINING PROGRAM

## 2025-03-13 RX ORDER — SERTRALINE HYDROCHLORIDE 25 MG/1
TABLET, FILM COATED ORAL
Qty: 315 TABLET | Refills: 0 | Status: SHIPPED | OUTPATIENT
Start: 2025-03-13 | End: 2025-08-28

## 2025-03-13 NOTE — ASSESSMENT & PLAN NOTE
- Persistent lightheadedness, possibly related to blood pressure fluctuations and Zoloft tapering  - Advise to stay hydrated, wear compression stockings, and rise slowly  - Consider physical therapy if symptoms persist  - Holter monitor test to rule out arrhythmias  Orders:    Holter Monitor - 72 Hour Up To 15 Days; Future

## 2025-03-13 NOTE — PROGRESS NOTES
Stefan Valencia M.D.  Internal Medicine  [facility]  [address], [address]  [place] [address]  [phone]      Chief Complaint  Medication Problem (Patient had an anxiety attack while lowering dosage of Zoloft. )    SUBJECTIVE  Sheela Soto is a 80 y.o. female With a pertinent medical history of hypertension, hyperlipidemia, GERD, thyroid disease, obstructive sleep apnea, anemia who presents to the office today as  an established patient that last saw me on 1/2/2025.    Patient here for medication management.  She is weaning off of Zoloft.  She was on Zoloft for over 20 years.  She was concerned Zoloft was contributing to dizziness.    Patient was instructed to take 3 tablets (75 mg) for 2 weeks, then decrease to 2 tablets (50 mg) for 2 weeks, then decrease to 1 tablet (25 mg) for 2 weeks then decrease to 1 tablet every other day for 2 weeks and then stop.     Now taking 3.5 tablets of Zoloft after she called for concerns for dizziness and was advised by on-call provider to increase to 3.5.     History of Present Illness  The patient is an 80-year-old female here for a follow-up. She has been trying to reduce her Zoloft dosage, which she has been on for 20 years, but had a bad experience recently. After taking 3 tablets on the 15th day, she started crying uncontrollably and felt very confused, unable to remember names or her address. Dr. Guevara advised taking an additional half tablet, which helped her feel clearer within about 2 hours. She is thinking about seeing a psychiatrist. She doesn't feel depressed or have any major neurological concerns but notes that she is moving more slowly in general. She is currently on 3.5 tablets of Zoloft and is considering reducing it further, but is worried about withdrawal side effects, especially with her grandchildren's upcoming graduation. She remembers a similar episode of confusion and dizziness after her 's death, which required  counseling.    She reports feeling lightheaded off and on, describing it as the room spinning rather than blacking out. She hasn't done physical therapy for dizziness or vertigo. She is trying to drink more water because she thinks dehydration might be a factor and experiences frequent nighttime urination, up to 4 times a night. She is concerned about how restricting water before bed might affect her lymphedema and has been advised to drink 8 glasses of water daily. She hasn't had any falls or fainting but mentioned a near-fainting episode. She wears compression stockings every day and checks her blood pressure at home, planning to bring the log to her next cardiology appointment. She is worried about the risk of stroke because her blood pressure fluctuates and she feels lightheaded. Atrial fibrillation has been suggested but not confirmed, and she hasn't had a Holter monitor test. Her lightheadedness gets better with activity after sitting for a long time.    Her blood pressure readings have been inconsistent, with a significant drop during her recent episode of confusion and dizziness. She monitors her blood pressure at home and will bring the log to her next cardiology appointment. She is concerned about the risk of stroke due to fluctuating blood pressure and lightheadedness.    She is on thyroid medication, which she takes first thing in the morning and waits before drinking or taking other medicine. She has a 6-month checkup scheduled in June.    MEDICATIONS  Current: Zoloft    Presents for follow-up visit.  Symptoms occur most days. The severity of symptoms is moderate. The symptoms are aggravated by medication. The patient sleeps 10 hours per night. The quality of sleep is fair. Compliance with medications is %. Side effects of treatment include fatigue.     Review of Systems   Constitutional:  Positive for malaise/fatigue. Negative for irritability.   Respiratory:  Negative for shortness of breath.     Cardiovascular:  Negative for chest pain and palpitations.   Gastrointestinal:  Negative for nausea.   Neurological:  Positive for dizziness.   Psychiatric/Behavioral:  Negative for confusion. The patient does not have insomnia.    Allergies   Allergen Reactions    Codeine Nausea Only    Latex Rash     Doesn't like to wear latex gloves          Outpatient Medications Marked as Taking for the 3/13/25 encounter (Office Visit) with Stefan Valencia MD   Medication Sig Dispense Refill    aspirin 81 MG EC tablet Take 1 tablet by mouth Daily.      atorvastatin (LIPITOR) 40 MG tablet Take 1 tablet by mouth Every Night. 90 tablet 1    Cholecalciferol (Vitamin D3) 75 MCG (3000 UT) tablet Take  by mouth.      estradiol (ESTRACE) 0.1 MG/GM vaginal cream INSERT 2G INTO THE VAGINA DAILY 42.5 g 2    gabapentin (NEURONTIN) 100 MG capsule TAKE ONE CAPSULE BY MOUTH EVERY EVENING 90 capsule 0    levothyroxine (SYNTHROID, LEVOTHROID) 112 MCG tablet TAKE ONE TABLET BY MOUTH DAILY 180 tablet 0    Lyllana 0.0375 MG/24HR patch Place 1 patch on the skin as directed by provider 2 (Two) Times a Week.      sertraline (Zoloft) 25 MG tablet Take 3.5 tablets by mouth Daily for 28 days, THEN 3 tablets Daily for 28 days, THEN 2.5 tablets Daily for 28 days, THEN 2 tablets Every Other Day for 28 days, THEN 1.5 tablets Every Other Day for 28 days, THEN 1 tablet Every Other Day for 28 days. 315 tablet 0    Zinc Sulfate 220 (50 Zn) MG tablet Take  by mouth.      [DISCONTINUED] sertraline (Zoloft) 25 MG tablet Take 3 tablets by mouth Daily for 14 days, THEN 2 tablets Daily for 14 days, THEN 1 tablet Daily for 14 days, THEN 1 tablet Every Other Day for 14 days. 91 tablet 0        Past Medical History:   Diagnosis Date    Anemia 3/30/2023    Had never been told this before…  put on iron prescription every other  day.day,    Anxiety , son killed in a motorcycle accident, put on zoloft.    My Son , age 27    Arthritis     This was noticeable in  knees , had both knees replaced,     Cataract Years ago, surgeries       Kept watching them, had both eye surgeries. August and 2023.    Depression  my  27 year old son .    Zoloft. Helps me stay calm and not sad.    GERD (gastroesophageal reflux disease) 2006    20 mg Omeprazole  just 10 mg’s daily works perfect    History of medical problems     Sleep Apnea. Use machine nightly    Hyperlipidemia     Hypertension     Hypothyroidism     Neuromuscular disorder Around     Restless Legs, both, on Prescription OpiniRole,  helps some…    Sleep apnea     Urinary tract infection     UTI Dr. Laura Fuller, on Estradiol cream, 0.015%    Visual impairment Age 8,      Wear readers now only.  Had both eyes cataract surgeries .     Past Surgical History:   Procedure Laterality Date    ADENOIDECTOMY      Adnoids? & tonsils as an 7 yr. Old    COLONOSCOPY  2005    Only once no problems    EYE SURGERY  August & 2023    Cataract surgeries both eyes 2 weeks apart.    HYSTERECTOMY      JOINT REPLACEMENT  2007 toes.    Knees    Both knees replaced, toes operated on,    REPLACEMENT TOTAL KNEE BILATERAL      TONSILLECTOMY      As an 7 year old child.      TUBAL ABDOMINAL LIGATION      No problems     Family History   Problem Relation Age of Onset    Pneumonia Mother     Depression Mother         Depression runs in my family on both sides.    Hyperlipidemia Mother     Hypertension Mother         All five of us, me and 4 siblings have  high blood pressure.    Miscarriages / Stillbirths Mother         She had miscarriages.    Vision loss Mother         Had Glaucoma later in life,  late 60’s.    Transient ischemic attack Mother     Hyperlipidemia Sister     Hypertension Sister         Age 77    Kidney disease Sister         Under a Kidney Specialist.    Hyperlipidemia Brother     Hyperlipidemia Brother     Hypertension Brother         Age 72    Hyperlipidemia Brother      "Hypertension Brother         Age 70    Hyperlipidemia Brother     Hypertension Brother         Age 66    Autism Other     reports that she has never smoked. She has never been exposed to tobacco smoke. She has never used smokeless tobacco. She reports current alcohol use of about 3.0 standard drinks of alcohol per week. She reports that she does not use drugs.    OBJECTIVE    Vital Signs:   /92   Pulse 78   Ht 174 cm (68.5\")   Wt 70.6 kg (155 lb 9.6 oz)   SpO2 97%   BMI 23.31 kg/m²     Physical Exam  Constitutional:       Appearance: Normal appearance.   Cardiovascular:      Rate and Rhythm: Normal rate and regular rhythm.      Heart sounds: Normal heart sounds. No murmur heard.  Pulmonary:      Effort: Pulmonary effort is normal.      Breath sounds: Normal breath sounds.   Musculoskeletal:      Right lower leg: No edema.      Left lower leg: No edema.   Skin:     General: Skin is warm and dry.   Neurological:      Mental Status: She is alert.   Psychiatric:         Behavior: Behavior normal.          Physical Exam  General Appearance: Normal.  HEENT: Within normal limits.  Respiratory: Within normal limits.  Cardiovascular: Blood pressure is 178/76 while lying down, 165/78 while sitting, and 138/71 while standing.  Skin: Warm and dry, no rash.  Neurological: Grossly intact.    The following data was reviewed by: Stefan Valencia MD on 03/13/2025:  CMP          6/25/2024    10:03 12/4/2024    10:23 1/27/2025    12:00   CMP   Glucose 95  92  86    BUN 16  16  13    Creatinine 0.65  0.72  0.68    EGFR 89.1  84  87.6    Sodium 139  141  141    Potassium 4.1  4.2  4.2    Chloride 101  105  105    Calcium 9.5  9.2  9.2    BUN/Creatinine Ratio 24.6  22  19.1    Anion Gap   11.5      CBC w/diff          12/4/2024    10:23   CBC w/Diff   WBC 5.4    RBC 4.13    Hemoglobin 12.7    Hematocrit 38.3    MCV 93    MCH 30.8    MCHC 33.2    RDW 11.9    Platelets 247    Neutrophil Rel % 53    Lymphocyte Rel % 35    Monocyte Rel " % 8    Eosinophil Rel % 3    Basophil Rel % 1      Lipid Panel          5/23/2024    10:53   Lipid Panel   Total Cholesterol 264    Triglycerides 90    HDL Cholesterol 94    VLDL Cholesterol 15    LDL Cholesterol  155    LDL/HDL Ratio 1.62      TSH          12/4/2024    10:23   TSH   TSH 1.750                  ASSESSMENT & PLAN        Orthostatic hypotension  - Inconsistent blood pressure readings with significant drop during confusion and dizziness  - Possibly due to Zoloft tapering or orthostatic hypotension  - Advise to maintain hydration, wear compression stockings, and rise slowly  - Monitor blood pressure at home and bring log to next appointment           Recurrent major depressive disorder, in full remission  - Experiencing lightheadedness and dizziness, she was evaluated by cardiology who suggested maybe this could be a side effect of Zoloft.  She has been on Zoloft for over 20 years.  Patient was interested in weaning from Zoloft to see if her symptoms improved.  She unfortunately had a setback when decreasing dose to 75 mg daily with an episode of confusion and emotional lability.  I do have some concerns that her dizziness symptoms will probably not improve as she weans from Zoloft and she may have discontinuation side effects.    - She would like to proceed with continuing weaning from Zoloft.  She is requesting a psychiatry referral.  - Continue current dosage of 3.5 tablets until psychiatrist consultation  - Contact office if unusual symptoms occur    Orders:    Ambulatory Referral to Psychiatry    sertraline (Zoloft) 25 MG tablet; Take 3.5 tablets by mouth Daily for 28 days, THEN 3 tablets Daily for 28 days, THEN 2.5 tablets Daily for 28 days, THEN 2 tablets Every Other Day for 28 days, THEN 1.5 tablets Every Other Day for 28 days, THEN 1 tablet Every Other Day for 28 days.    Confusion  She had an episode of confusion associated with weaning from Zoloft.  Denies neurological symptoms currently other  than just not feeling like her usual self.  Reviewed stroke symptoms today.  Advised if she has stroke symptoms to go to emergency department.  Patient felt better after taking another half tablet of Zoloft.         Primary hypertension  Blood pressure elevated today.  She is no longer on antihypertensives.  She previously was on amlodipine.  She notes labile blood pressure readings at home.         Dizziness  - Persistent lightheadedness, possibly related to blood pressure fluctuations and Zoloft tapering  - Advise to stay hydrated, wear compression stockings, and rise slowly  - Consider physical therapy if symptoms persist  - Holter monitor test to rule out arrhythmias  Orders:    Holter Monitor - 72 Hour Up To 15 Days; Future      Assessment & Plan            Health Maintenance Due   Topic Date Due    TDAP/TD VACCINES (1 - Tdap) Never done    RSV Vaccine - Adults (1 - 1-dose 75+ series) Never done    INFLUENZA VACCINE  07/01/2024    COVID-19 Vaccine (5 - 2024-25 season) 09/01/2024    ANNUAL WELLNESS VISIT  10/04/2024        Follow Up  No follow-ups on file.    Patient/family had no further questions at this time and verbalized understanding of the plan discussed today.     Patient or patient representative verbalized consent for the use of Ambient Listening during the visit with  Stefan Valencia MD for chart documentation. 3/13/2025  13:05 EDT

## 2025-03-13 NOTE — ASSESSMENT & PLAN NOTE
Blood pressure elevated today.  She is no longer on antihypertensives.  She previously was on amlodipine.  She notes labile blood pressure readings at home.

## 2025-03-13 NOTE — ASSESSMENT & PLAN NOTE
- Experiencing lightheadedness and dizziness, she was evaluated by cardiology who suggested maybe this could be a side effect of Zoloft.  She has been on Zoloft for over 20 years.  Patient was interested in weaning from Zoloft to see if her symptoms improved.  She unfortunately had a setback when decreasing dose to 75 mg daily with an episode of confusion and emotional lability.  I do have some concerns that her dizziness symptoms will probably not improve as she weans from Zoloft and she may have discontinuation side effects.    - She would like to proceed with continuing weaning from Zoloft.  She is requesting a psychiatry referral.  - Continue current dosage of 3.5 tablets until psychiatrist consultation  - Contact office if unusual symptoms occur    Orders:    Ambulatory Referral to Psychiatry    sertraline (Zoloft) 25 MG tablet; Take 3.5 tablets by mouth Daily for 28 days, THEN 3 tablets Daily for 28 days, THEN 2.5 tablets Daily for 28 days, THEN 2 tablets Every Other Day for 28 days, THEN 1.5 tablets Every Other Day for 28 days, THEN 1 tablet Every Other Day for 28 days.

## 2025-03-18 RX ORDER — FERROUS GLUCONATE 324(37.5)
324 TABLET ORAL EVERY OTHER DAY
Qty: 45 TABLET | Refills: 0 | Status: SHIPPED | OUTPATIENT
Start: 2025-03-18

## 2025-03-28 ENCOUNTER — HOSPITAL ENCOUNTER (OUTPATIENT)
Dept: CARDIOLOGY | Facility: HOSPITAL | Age: 80
Discharge: HOME | End: 2025-03-28
Payer: MEDICARE

## 2025-03-28 DIAGNOSIS — R42 DIZZINESS: ICD-10-CM

## 2025-03-28 PROCEDURE — 93246 EXT ECG>7D<15D RECORDING: CPT

## 2025-04-11 ENCOUNTER — PATIENT MESSAGE (OUTPATIENT)
Dept: INTERNAL MEDICINE | Facility: CLINIC | Age: 80
End: 2025-04-11
Payer: MEDICARE

## 2025-04-11 DIAGNOSIS — F33.42 RECURRENT MAJOR DEPRESSIVE DISORDER, IN FULL REMISSION: ICD-10-CM

## 2025-04-11 RX ORDER — SERTRALINE HYDROCHLORIDE 25 MG/1
87.5 TABLET, FILM COATED ORAL DAILY
Qty: 98 TABLET | Refills: 0 | Status: SHIPPED | OUTPATIENT
Start: 2025-04-11 | End: 2025-05-09

## 2025-04-18 LAB
CV ZIO BASELINE AVG BPM: 65 BPM
CV ZIO BASELINE BPM HIGH: 164 BPM
CV ZIO BASELINE BPM LOW: 48 BPM
CV ZIO DEVICE ANALYSIS TIME: NORMAL
CV ZIO ECT SVE COUNT: 4847 EPISODES
CV ZIO ECT SVE CPLT COUNT: 160 EPISODES
CV ZIO ECT SVE CPLT FREQ: NORMAL
CV ZIO ECT SVE FREQ: NORMAL
CV ZIO ECT SVE TPLT COUNT: 50 EPISODES
CV ZIO ECT SVE TPLT FREQ: NORMAL
CV ZIO ECT VE COUNT: 358 EPISODES
CV ZIO ECT VE CPLT COUNT: 0 EPISODES
CV ZIO ECT VE CPLT FREQ: 0
CV ZIO ECT VE FREQ: NORMAL
CV ZIO ECT VE TPLT COUNT: 0 EPISODES
CV ZIO ECT VE TPLT FREQ: 0
CV ZIO ECTOPIC SVE COUPLET RAW PERCENT: 0.03 %
CV ZIO ECTOPIC SVE ISOLATED PERCENT: 0.38 %
CV ZIO ECTOPIC SVE TRIPLET RAW PERCENT: 0.01 %
CV ZIO ECTOPIC VE COUPLET RAW PERCENT: 0 %
CV ZIO ECTOPIC VE ISOLATED PERCENT: 0.03 %
CV ZIO ECTOPIC VE TRIPLET RAW PERCENT: 0 %
CV ZIO ENROLLMENT END: NORMAL
CV ZIO ENROLLMENT START: NORMAL
CV ZIO L TRIGEMINY DUR: 29.4 SEC
CV ZIO L TRIGEMINY END: NORMAL
CV ZIO L TRIGEMINY START: NORMAL
CV ZIO PATIENT EVENTS DIARIES: 1
CV ZIO PATIENT EVENTS TRIGGERS: 1
CV ZIO PAUSE COUNT: 0
CV ZIO PRESCRIPTION STATUS: NORMAL
CV ZIO SVT AVG BPM: 115 BPM
CV ZIO SVT BPM HIGH: 164 BPM
CV ZIO SVT BPM LOW: 69 BPM
CV ZIO SVT COUNT: 53
CV ZIO SVT F EPI AVG BPM: 155 BPM
CV ZIO SVT F EPI BEATS: 4 BEATS
CV ZIO SVT F EPI BPM HIGH: 164 BPM
CV ZIO SVT F EPI BPM LOW: 140 BPM
CV ZIO SVT F EPI DUR: 1.2 SEC
CV ZIO SVT F EPI END: NORMAL
CV ZIO SVT F EPI START: NORMAL
CV ZIO SVT L EPI AVG BPM: 104 BPM
CV ZIO SVT L EPI BEATS: 8 BEATS
CV ZIO SVT L EPI BPM HIGH: 108 BPM
CV ZIO SVT L EPI BPM LOW: 96 BPM
CV ZIO SVT L EPI DUR: 5.1 SEC
CV ZIO SVT L EPI END: NORMAL
CV ZIO SVT L EPI START: NORMAL
CV ZIO TOTAL  ENROLLMENT PERIOD: NORMAL
CV ZIO VT COUNT: 0

## 2025-04-23 ENCOUNTER — OFFICE VISIT (OUTPATIENT)
Age: 80
End: 2025-04-23
Payer: MEDICARE

## 2025-04-23 VITALS
SYSTOLIC BLOOD PRESSURE: 152 MMHG | HEIGHT: 69 IN | BODY MASS INDEX: 23.25 KG/M2 | WEIGHT: 157 LBS | DIASTOLIC BLOOD PRESSURE: 89 MMHG | HEART RATE: 68 BPM | OXYGEN SATURATION: 98 %

## 2025-04-23 DIAGNOSIS — F41.9 ANXIETY DISORDER, UNSPECIFIED TYPE: Primary | ICD-10-CM

## 2025-04-23 RX ORDER — SERTRALINE HYDROCHLORIDE 25 MG/1
81.25 TABLET, FILM COATED ORAL DAILY
Start: 2025-04-23

## 2025-04-23 NOTE — PROGRESS NOTES
"Chief Complaint: \"I am wanting to get off the Zoloft\"    Subjective      Sheela Soto presents to [facility] for a mental health evaluation.     HPI :     Pt is a 80 y.o. yo femelke who is being seen here at the clinic for a mental health evaluation. Pt presents wanting to come off her Zoloft. Pt states that her cardiologist wants her to come off zoloft to see if it is the cause for her dizziness. States she has been on zoloft 100mg daily for the past 26 years and is unsure why she is on the medication anyway.  Zoloft was initially started 26 years ago after patient had difficulty coping with the passing of her son.  Patient states that she was just continued on the Zoloft and no one ever talked about coming off of the medication.  Reports that she had no side effects to the medication but did start experiencing dizziness and lightheadedness that started about a year ago.  States that her cardiologist discontinued all her blood pressure medications but she still feels dizzy and lightheaded.  Reports that ever since stopping her blood pressure medications her blood pressure has been on the higher side but states that her cardiologist is okay with that.  Patient states that her cardiologist wishes to take her off Zoloft to see if that is the source of her dizziness/lightheadedness.  Patient states that she has not had any difficulty with anxiety or depression for decades and would like to come off the Zoloft anyway.  Reports that in March her primary care reduced her Zoloft from 100 mg daily to 75 mg daily but about 2 weeks after the dose reduction she experienced a panic attack that lasted for hours.  States that they called the PCP office and they changed her Zoloft dosage from 75 mg to 87.5 mg.  Reports that ever since increasing the dose from 75 to 87.5 mg daily she has been doing great.  Reports to feel anxious about half the days of the week but states that she is only " anxious about the fact that she is dizzy and cannot find the reason why.  Denies feeling depressed.  Reports to be sleeping 8 to 10 hours each night.  Patient does have a history of sleep apnea and used to wear her CPAP machine.  States that she lost weight and felt that she no longer needs the CPAP machine so patient stopped using it several months ago.  Appetite is good.  Denies SI/HI/AVH.    Psychiatric Review of Systems:    Depression: Denies feeling depressed.   Alem: Denies symptoms of alem.   Anxiety: Reports to feel anxious about half the days a week but feels it is manageable.   Psychosis: Denies symptoms of psychosis.   Panic Attacks: Denies panic attacks other than on the 1 panic attack she experienced when reducing the medication in March.   Agoraphobia: Symptoms of agoraphobia.   OCD: Denies symptoms of OCD.   Eating Disorders: Denies symptoms of an eating disorder.   PTSD: Denies symptoms of PTSD.  Specific Phobias: Denies any phobias.  Borderline Personality DO: Denies symptoms of borderline personality disorder.  Antisocial Personality DO: Denies symptoms of antisocial personality disorder.    Past Psychiatric History:   Diagnoses: anxiety and depression after her son passed away.   Hospitalizations: Denies.   Counseling: Some therapy after her son and  passed away.   Suicide attempts: Denies.   Self Harm: Denies.     Previous Psych Meds: Just Zoloft.     Substance Use/Abuse:   Caffeine: 4 cups of coffee.   Alcohol: 3 glasses of wine a week.   Tobacco: Denies.   Illicit substances: Denies.   IVDU: Denies.   History of formal substance abuse treatment: Denies.     Social History:    Family structure: Lives alone but her grandson has an apartment in her house that he lives in when he is in town.    Education: Some college.    Employment: Retired.    Supportive relationships: daughter.     Anglican/Claudia: Anabaptism.     Abuse History: Denies.      Legal History:    Incarceration: Denies.     History of violence: Denies.      History: Denies.     Past Medical/Developmental History:    Chronic Illnesses: Listed below.    Head trauma: Hit her head during a fall about 2 years ago.     Past Medical History:   Diagnosis Date    Anemia 3/30/2023    Had never been told this before…  put on iron prescription every other  day.day,    Anxiety , son killed in a motorcycle accident, put on zoloft.    My Son , age 27    Arthritis     This was noticeable in knees , had both knees replaced,     Cataract Years ago, surgeries       Kept watching them, had both eye surgeries. August and 2023.    Depression  my  27 year old son .    Zoloft. Helps me stay calm and not sad.    GERD (gastroesophageal reflux disease)     20 mg Omeprazole  just 10 mg’s daily works perfect    History of medical problems     Sleep Apnea. Use machine nightly    Hyperlipidemia     Hypertension     Hypothyroidism     Neuromuscular disorder Around     Restless Legs, both, on Prescription OpiniRole,  helps some…    Sleep apnea     Urinary tract infection     UTI Dr. Laura Fuller, on Estradiol cream, 0.015%    Visual impairment Age [id number]    Wear readers now only.  Had both eyes cataract surgeries .      Family Psychiatric History:    Psychiatric history: Denies.     Current Medications:   Current Outpatient Medications   Medication Sig Dispense Refill    aspirin 81 MG EC tablet Take 1 tablet by mouth Daily.      atorvastatin (LIPITOR) 40 MG tablet Take 1 tablet by mouth Every Night. 90 tablet 1    Cholecalciferol (Vitamin D3) 75 MCG (3000 UT) tablet Take  by mouth.      estradiol (ESTRACE) 0.1 MG/GM vaginal cream INSERT 2G INTO THE VAGINA DAILY 42.5 g 2    ferrous gluconate (FERGON) 324 MG tablet Take 1 tablet by mouth Daily With Breakfast. 90 tablet 2    gabapentin (NEURONTIN) 100 MG capsule TAKE ONE CAPSULE BY MOUTH EVERY EVENING 90 capsule 0    levothyroxine (SYNTHROID, LEVOTHROID) 112  "MCG tablet TAKE ONE TABLET BY MOUTH DAILY 180 tablet 0    Lyllana 0.0375 MG/24HR patch Place 1 patch on the skin as directed by provider 2 (Two) Times a Week.      Zinc Sulfate 220 (50 Zn) MG tablet Take  by mouth.      sertraline (Zoloft) 25 MG tablet Take 3.25 tablets by mouth Daily.       No current facility-administered medications for this visit.     Review of Systems   Constitutional:  Negative for appetite change.   HENT:  Negative for sore throat.    Eyes:  Negative for visual disturbance.   Respiratory:  Negative for chest tightness and shortness of breath.    Cardiovascular:  Negative for chest pain and palpitations.   Gastrointestinal:  Negative for abdominal pain, constipation, diarrhea and nausea.   Genitourinary:  Negative for difficulty urinating.   Neurological:  Negative for dizziness, tremors and memory problem.   Psychiatric/Behavioral:  Negative for hallucinations, sleep disturbance and suicidal ideas.         Mental Status Exam:   MENTAL STATUS EXAM   General Appearance:  Cleanly groomed and dressed  Eye Contact:  Good eye contact  Attitude:  Cooperative  Motor Activity:  Normal gait, posture  Muscle Strength:  Normal  Speech:  Normal rate, tone, volume  Language:  Spontaneous  Mood and affect:  Normal, pleasant  Hopelessness:  Denies  Loneliness: Denies  Thought Process:  Logical  Associations/ Thought Content:  No delusions  Hallucinations:  None  Suicidal Ideations:  Not present  Homicidal Ideation:  Not present  Sensorium:  Alert and clear  Orientation:  Person, place, time and situation  Attention Span/ Concentration:  Good  Fund of Knowledge:  Appropriate for age and educational level  Intellectual Functioning:  Average range  Insight:  Good  Judgement:  Good  Reliability:  Good  Impulse Control:  Good       Objective   Vital Signs:   /89   Pulse 68   Ht 174 cm (68.5\")   Wt 71.2 kg (157 lb)   SpO2 98%   BMI 23.52 kg/m²       Result Review :       TSH          12/4/2024    10:23 "   TSH   TSH 1.750                Assessment and Plan          PHQ-9 Score:   PHQ-9 Total Score: 2    PHQ-9 Depression Screening  Little interest or pleasure in doing things? Not at all   Feeling down, depressed, or hopeless? Not at all   PHQ-2 Total Score 0   Trouble falling or staying asleep, or sleeping too much? Not at all   Feeling tired or having little energy? Over half   Poor appetite or overeating? Not at all   Feeling bad about yourself - or that you are a failure or have let yourself or your family down? Not at all   Trouble concentrating on things, such as reading the newspaper or watching television? Not at all   Moving or speaking so slowly that other people could have noticed? Or the opposite - being so fidgety or restless that you have been moving around a lot more than usual? Not at all   Thoughts that you would be better off dead, or of hurting yourself in some way? Not at all   PHQ-9 Total Score 2   If you checked off any problems, how difficult have these problems made it for you to do your work, take care of things at home, or get along with other people? Somewhat difficult     Depression Screening:  Patient screened positive for depression based on a PHQ-9 score of 2 on 4/23/2025. Follow-up recommendations include:  N/A .    SHAD-7      Over the last two weeks, how often have you been bothered by the following problems?  Feeling nervous, anxious or on edge: More than half the days  Not being able to stop or control worrying: Not at all  Worrying too much about different things: Not at all  Trouble Relaxing: Not at all  Being so restless that it is hard to sit still: Not at all  Becoming easily annoyed or irritable: Not at all  Feeling afraid as if something awful might happen: Not at all  SHAD 7 Total Score: 2               Tobacco Cessation:  N/A.    Impression/Treatment Plan:  Patient is an 80-year-old female.  Patient came to provider wanting to be taken off Zoloft.  Patient has been on Zoloft  for 20+ years which was initially started after having difficulty coping with her son's death but has not experienced anxiety or depression in decades.  Patient is wanting to come off Zoloft not only because she feels she no longer needs it but her cardiologist feels it may be a cause for her dizziness. Reports that in March her primary care reduced her Zoloft from 100 mg daily to 75 mg daily but about 2 weeks after the dose reduction she experienced a panic attack that lasted for hours.  States that they called the PCP office and they changed her Zoloft dosage from 75 mg to 87.5 mg.  Reports that ever since increasing the dose from 75 to 87.5 mg daily she has been doing great.  Reports to feel anxious about half the days of the week but states that she is only anxious about the fact that she is dizzy and cannot find the reason why.  Denies feeling depressed.  Patient's PHQ-9 and SHAD-7 scores are low today.  Given that patient has not had difficulty with anxiety or depressive symptoms in decades, I do think it is appropriate for patient to try coming off the Zoloft.  Patient did fine with a 12.5 mg reduction dose of her Zoloft (100mg to 87.5mg).  Patient is currently taking Zoloft 87.5 mg daily.  Will reduce Zoloft dose again by 12.5 mg daily for a total of 75 mg daily but patient's daughter was hesitant to drop her dose by that much.  Discussed with patient and daughter that they could reduce the dose by 6.25 mg daily instead of 12.5mg daily.  Discussed with patient that she could do 3.25 tablets / 81.25 mg daily or 3 tablets / 75 mg daily.  Will see patient in 6 weeks.  Patient states that she will restart reducing her dose in 2 weeks after her grandson's graduation.    Short-term goals: Continue to develop rapport with patient.    Long-term goals: Taper patient off Zoloft.    Weakness: Previously experienced difficulty coming off Zoloft.    Strengths: Great support from family.    Diagnoses and all orders for  this visit:    1. Anxiety disorder, unspecified type (Primary)    Other orders  -     sertraline (Zoloft) 25 MG tablet; Take 3.25 tablets by mouth Daily.      MEDS ORDERED DURING VISIT:  New Medications Ordered This Visit   Medications    sertraline (Zoloft) 25 MG tablet     Sig: Take 3.25 tablets by mouth Daily.       Follow Up   Return in about 4 weeks (around 5/21/2025) for Recheck.  Patient was given instructions and counseling regarding her condition or for health maintenance advice. Please see specific information pulled into the AVS if appropriate.     PATIENT EDUCATION:  - Discussed medication options and treatment plan of prescribed medication as well as the risks, benefits, and side effects   - Encouraged pt to continue supportive psychotherapy efforts and medications as indicated.    Treatment and medication options discussed during today's visit. Patient acknowledged and verbally consented to continue with current treatment plan and was educated on the importance of compliance with treatment and follow-up appointments. Patient is agreeable to call the office with any worsening of symptoms or onset of side effects. Patient is agreeable to call 911 or go to the nearest ER should he/she begin having SI/HI.    Sebastian reviewed and is appropriate.    BRIANA Warner, PMHNP-BC    Part of this note may be an electronic transcription/translation of spoken language to printed text using the Dragon Dictation System.

## 2025-05-13 ENCOUNTER — OFFICE VISIT (OUTPATIENT)
Facility: HOSPITAL | Age: 80
End: 2025-05-13
Payer: MEDICARE

## 2025-05-13 ENCOUNTER — OFFICE VISIT (OUTPATIENT)
Dept: CARDIOLOGY | Age: 80
End: 2025-05-13
Payer: MEDICARE

## 2025-05-13 VITALS
WEIGHT: 151 LBS | HEART RATE: 64 BPM | BODY MASS INDEX: 22.63 KG/M2 | OXYGEN SATURATION: 96 % | DIASTOLIC BLOOD PRESSURE: 70 MMHG | SYSTOLIC BLOOD PRESSURE: 130 MMHG

## 2025-05-13 VITALS — HEIGHT: 69 IN | WEIGHT: 154 LBS | OXYGEN SATURATION: 97 % | HEART RATE: 72 BPM | BODY MASS INDEX: 22.81 KG/M2

## 2025-05-13 DIAGNOSIS — E78.2 MIXED HYPERLIPIDEMIA: ICD-10-CM

## 2025-05-13 DIAGNOSIS — I25.10 MILD CAD: Primary | ICD-10-CM

## 2025-05-13 DIAGNOSIS — R42 DIZZINESS: ICD-10-CM

## 2025-05-13 DIAGNOSIS — G47.33 OBSTRUCTIVE SLEEP APNEA: Primary | ICD-10-CM

## 2025-05-13 DIAGNOSIS — I10 PRIMARY HYPERTENSION: ICD-10-CM

## 2025-05-13 DIAGNOSIS — R63.4 WEIGHT LOSS: ICD-10-CM

## 2025-05-13 DIAGNOSIS — I51.89 DIASTOLIC DYSFUNCTION: ICD-10-CM

## 2025-05-13 PROCEDURE — 3078F DIAST BP <80 MM HG: CPT | Performed by: FAMILY MEDICINE

## 2025-05-13 PROCEDURE — 99214 OFFICE O/P EST MOD 30 MIN: CPT | Performed by: FAMILY MEDICINE

## 2025-05-13 PROCEDURE — 3075F SYST BP GE 130 - 139MM HG: CPT | Performed by: FAMILY MEDICINE

## 2025-05-13 PROCEDURE — G0463 HOSPITAL OUTPT CLINIC VISIT: HCPCS

## 2025-05-13 PROCEDURE — 93000 ELECTROCARDIOGRAM COMPLETE: CPT | Performed by: FAMILY MEDICINE

## 2025-05-13 NOTE — PROGRESS NOTES
"Follow Up [facility] Note     Chief Complaint:  OMAYRA     Primary Care Physician: Stefan Valencia MD    Interval History:   The patient is a 80 y.o. female  who was last seen in the sleep lab: 9/4/2024.  PSG has showed AHI of 7 lowest SpO2 84%.  I last saw patient she was on ropinirole for RLS prescribed by primary care.  She is also on CPAP 8 cm H2O.  Doing well with CPAP.  However has stopped using PAP machine.  No data available today. Has lost 30 lbs since her last sleep study. Curious if mild OMAYRA has resolved with weight loss.     Review of Systems:    A complete review of systems was done and all were negative with the exception of anxiety depression    Social History:    Social History     Socioeconomic History    Marital status:     Number of children: 2   Tobacco Use    Smoking status: Never     Passive exposure: Never    Smokeless tobacco: Never    Tobacco comments:     Caffeine: 2-3 cups daily   Vaping Use    Vaping status: Never Used   Substance and Sexual Activity    Alcohol use: Yes     Alcohol/week: 3.0 standard drinks of alcohol     Types: 3 Glasses of wine per week     Comment: I drink “red” wine with dinner sometimes.    Drug use: Never    Sexual activity: Not Currently     Partners: Male     Birth control/protection: None, Tubal ligation, Hysterectomy       Allergies:  Codeine and Latex     Medication Review:  Reviewed.      Vital Signs:    Vitals:    05/13/25 1028   Pulse: 72   SpO2: 97%   Weight: 69.9 kg (154 lb)   Height: 174 cm (68.5\")     Body mass index is 23.08 kg/m².    Physical Exam:    Constitutional:  Well developed 80 y.o. female that appears in no apparent distress.  Awake & oriented times 3.  Normal mood with normal recent and remote memory and normal judgement.  Eyes:  Conjunctivae normal.  Oropharynx: Previously, moist mucous membranes.    Self-administered [place] Sleepiness Scale test results: 2  0-5 Lower normal daytime sleepiness  6-10 Higher normal daytime " sleepiness  11-12 Mild, 13-15 Moderate, & 16-24 Severe excessive daytime sleepiness    Impression:   1. Obstructive sleep apnea    2. Weight loss      30 lb weight loss since last study; will follow up HST to see if mild OMAYRA has resolved.     Plan:  Good sleep hygiene measures should be maintained.  Normal body weight by Body mass index is 23.08 kg/m²..        I answered all of the patient's questions.  The patient will call for any problems.      Claribel Lau MD  Sleep Medicine  05/13/25  10:44 EDT

## 2025-05-13 NOTE — PROGRESS NOTES
Date of Office Visit: 2025  Encounter Provider: BRIANA Bell  Place of Service: [facility]  Established cardiologist: Ginny Abreu MD  Patient Name: Sheela Soto  :1945      Patient ID:  Sheela Soto is a 80 y.o. female is here for  followup    With a pertinent medical history of hypertension, hyperlipidemia, GERD, thyroid disease, obstructive sleep apnea, anemia.  Her mom had hyperlipidemia, TIA and hypertension. Her sister has hyperlipidemia and hypertension. Her brother has hyperlipidemia and 3 of her brothers have hypertension. She is , has 2 children 1 who is still alive and a son who  in a motor vehicle accident. She is retired, has never smoked, has 3 red wine's per week and 2 cups of coffee per day.     History of Present Illness  ABIs done in 2023 were normal.       Echocardiogram 2024 EF 69%, mild concentric LVH, moderate to severe left atrial enlargement, G2 DD, calcified aortic valve without stenosis, trace mitral and tricuspid insufficiency.       She saw Dr. Abreu 2024.  At that time she had had uncontrolled blood pressures, fatigue, several months of right lower extremity swelling.  Swelling on exam appeared to be lymphedema.  Venous duplex was ordered and a referral to lymphedema clinic was placed.  Carotid bruit was noted and a carotid duplex was also ordered.     Bilateral carotid ultrasound done 2024 showed torturous right carotid artery with plaque present and less than 50% stenosis, a tortuous left carotid artery with no plaque and less than 50% stenosis.     10/2/2024 she had a normal bilateral lower extremity venous duplex scan.      2024 she called the office complaining of lightheadedness, dizziness, poor energy.  She took a home blood pressure that had low diastolic in the 50s and she had losartan.     Coronary CTA was done 2024 and this showed a calcium score of  36 distributed 35 in the LAD 1 RCA.  This showed mild one-vessel disease.  Mild sclerotic changes of the aortic valve.  Medical therapy was recommended.  Noncardiac CT findings showed a very large hiatal hernia.      1/7/2025 she called the office with reports of fatigue and low diastolic blood pressure.  She had stopped her amlodipine.  It was then very resumed and she did not feel well on the medicine.  Dr. Abreu advised to stop amlodipine and monitor blood pressure at home and bring in those readings to her follow-up.  Order for Zio monitor was canceled for the time being.    I saw Sheela in the office 2/11/2025 She was weaning off sertraline and during this process had been experiencing some dizziness and labile blood pressure readings.    Today Sheela presents for follow-up.  She tells me she is still experience some lightheadedness/dizziness however this has been present for years even before she was weaning off of her sertraline.  She has now been on 80 mg of sertraline daily she tried to go down to 75 mg and she had a panic attack.  She really does want to stop this medicine if she can and is willing to continue very slow weaning process of that.  Her home blood pressures have typically been on the higher side with an average reading of 140/60 on the days she had her a panic attack her blood pressure was 89/60, earlier that morning it was 163/74.  No chest pain or pressure.  No presyncope/syncope.  She has had falls from the lightheadedness in the past.  No palpitations no edema.    Her orthostatics today are negative and she had no symptoms during the testing.    130/70 supine, 130/60 standing 120/79 standing at 3 minutes, no tachycardia.     Current Outpatient Medications on File Prior to Visit   Medication Sig Dispense Refill    aspirin 81 MG EC tablet Take 1 tablet by mouth Daily.      atorvastatin (LIPITOR) 40 MG tablet Take 1 tablet by mouth Every Night. 90 tablet 1    Cholecalciferol (Vitamin D3)  75 MCG (3000 UT) tablet Take  by mouth.      estradiol (ESTRACE) 0.1 MG/GM vaginal cream INSERT 2G INTO THE VAGINA DAILY 42.5 g 2    ferrous gluconate (FERGON) 324 MG tablet Take 1 tablet by mouth Daily With Breakfast. 90 tablet 2    gabapentin (NEURONTIN) 100 MG capsule TAKE ONE CAPSULE BY MOUTH EVERY EVENING 90 capsule 0    levothyroxine (SYNTHROID, LEVOTHROID) 112 MCG tablet TAKE ONE TABLET BY MOUTH DAILY 180 tablet 0    Lyllana 0.0375 MG/24HR patch Place 1 patch on the skin as directed by provider 2 (Two) Times a Week.      sertraline (Zoloft) 25 MG tablet Take 3.25 tablets by mouth Daily.      Zinc Sulfate 220 (50 Zn) MG tablet Take  by mouth.       No current facility-administered medications on file prior to visit.         Procedures    ECG 12 Lead    Date/Time: 5/13/2025 2:09 PM  Performed by: Lu Lugo APRN    Authorized by: Lu Lugo APRN  Comparison: compared with previous ECG from 2/11/2025  Rhythm: sinus rhythm  BPM: 64            Objective:      Vitals:    05/13/25 1340   BP: 140/56   Pulse: 64   SpO2: 96%   Weight: 68.5 kg (151 lb)     Body mass index is 22.63 kg/m².  Wt Readings from Last 3 Encounters:   05/13/25 68.5 kg (151 lb)   05/13/25 69.9 kg (154 lb)   04/23/25 71.2 kg (157 lb)     Constitutional:       Appearance: Healthy appearance. Not in distress.   Eyes:      Pupils: Pupils are equal, round, and reactive to light.   Pulmonary:      Effort: Pulmonary effort is normal.      Breath sounds: Normal breath sounds.   Cardiovascular:      Normal rate. Regular rhythm.      Murmurs: There is no murmur.   Pulses:     Radial: 2+ bilaterally.     Dorsalis pedis: 2+ bilaterally.     Posterior tibial: 2+ bilaterally.  Edema:     No edema present.   Musculoskeletal:      Cervical back: Normal range of motion. Skin:     General: Skin is warm and dry.   Neurological:      Mental Status: Alert and oriented to person, place and time.   Psychiatric:         Speech: Speech normal.     Lab  Review:   1/27/2025 she completed an unremarkable BMP.  12/4/2024 TSH was 1.75,CBC was unremarkable, BMP was unremarkable.  Lipid panel 5/23/2024 total cholesterol 264, TG 90, HDL 94, .     Assessment:     1. Mild CAD    2. Diastolic dysfunction    3. Primary hypertension    4. Mixed hyperlipidemia    5. Dizziness      Mild 1V CAD on coronary CTA December 2024.  She has no angina.  She continues on ASA, statin  Hypertension, labile, fairly well-controlled on no medicines.  Off amlodipine 2.5 mg and for now remain off of this.   Hyperlipidemia on statin therapy  Diastolic dysfunction  Dizziness/lightheadedness, hx orthostasis and falls, this may have worsened with weaning sertraline.   Hypothyroidism on levothyroxine  Chronic lower extremity edema/lymphedema this is stable  GERD on omeprazole  Carotid artery plaques without significant stenosis    Plan:   No medication changes were made  She has an appointment with Dr. Abreu in July and we discussed pushing this out if she continues to feel stable vs. keeping as is for her continued weaning of sertraline and further BP monitoring and evaluation of her lightheadedness and dizziness.  She will keep the appointment as is for.     She is stable, orthostatics are negative. BP fairly well controlled on me recheck.            Thank you for allowing me to participate in this patient's care. Please call with any questions or concerns.          Dragon dictation device was utilized in this note.

## 2025-05-21 DIAGNOSIS — G25.81 RESTLESS LEG SYNDROME: ICD-10-CM

## 2025-05-21 RX ORDER — GABAPENTIN 100 MG/1
100 CAPSULE ORAL EVERY EVENING
Qty: 30 CAPSULE | Refills: 0 | Status: SHIPPED | OUTPATIENT
Start: 2025-05-21

## 2025-05-29 RX ORDER — LEVOTHYROXINE SODIUM 112 UG/1
112 TABLET ORAL DAILY
Qty: 180 TABLET | Refills: 0 | Status: SHIPPED | OUTPATIENT
Start: 2025-05-29

## 2025-06-05 ENCOUNTER — OFFICE VISIT (OUTPATIENT)
Dept: INTERNAL MEDICINE | Facility: CLINIC | Age: 80
End: 2025-06-05
Payer: MEDICARE

## 2025-06-05 VITALS
SYSTOLIC BLOOD PRESSURE: 120 MMHG | DIASTOLIC BLOOD PRESSURE: 68 MMHG | OXYGEN SATURATION: 97 % | HEART RATE: 62 BPM | HEIGHT: 69 IN | BODY MASS INDEX: 22.75 KG/M2 | WEIGHT: 153.6 LBS

## 2025-06-05 DIAGNOSIS — E03.9 HYPOTHYROIDISM, UNSPECIFIED TYPE: ICD-10-CM

## 2025-06-05 DIAGNOSIS — R63.4 WEIGHT LOSS: ICD-10-CM

## 2025-06-05 DIAGNOSIS — F41.9 ANXIETY: ICD-10-CM

## 2025-06-05 DIAGNOSIS — G25.81 RESTLESS LEG SYNDROME: ICD-10-CM

## 2025-06-05 DIAGNOSIS — I89.0 LYMPHEDEMA: ICD-10-CM

## 2025-06-05 DIAGNOSIS — Z00.00 MEDICARE ANNUAL WELLNESS VISIT, SUBSEQUENT: Primary | ICD-10-CM

## 2025-06-05 DIAGNOSIS — R42 DIZZINESS: ICD-10-CM

## 2025-06-05 DIAGNOSIS — E78.2 MIXED HYPERLIPIDEMIA: ICD-10-CM

## 2025-06-05 RX ORDER — SERTRALINE HYDROCHLORIDE 25 MG/1
TABLET, FILM COATED ORAL
Qty: 287 TABLET | Refills: 0 | Status: SHIPPED | OUTPATIENT
Start: 2025-06-05 | End: 2025-11-20

## 2025-06-05 NOTE — PROGRESS NOTES
Subjective   The ABCs of the Annual Wellness Visit  Medicare Wellness Visit      Sheela Soto is a 80 y.o.  With a pertinent medical history of hypertension, hyperlipidemia, GERD, thyroid disease, obstructive sleep apnea, anemia patient who presents for a Medicare Wellness Visit.    The following portions of the patient's history were reviewed and   updated as appropriate: allergies, current medications, past family history, past medical history, past social history, past surgical history, and problem list.    Compared to one year ago, the patient's physical   health is better.  Compared to one year ago, the patient's mental   health is the same.    Recent Hospitalizations:  She was not admitted to the hospital during the last year.     Current Medical Providers:  Patient Care Team:  Stefan Valencia MD as PCP - General (Internal Medicine)  Laura Fuller MD as Consulting Physician (Urogynecology)  Lu Lugo APRN as Nurse Practitioner (Cardiology)  Claribel Lau MD as Emergency Attending (Sleep Medicine)  Cristy Mabry APRN as Nurse Practitioner (Psychiatry)    Outpatient Medications Prior to Visit   Medication Sig Dispense Refill    aspirin 81 MG EC tablet Take 1 tablet by mouth Daily.      atorvastatin (LIPITOR) 40 MG tablet Take 1 tablet by mouth Every Night. 90 tablet 1    Cholecalciferol (Vitamin D3) 75 MCG (3000 UT) tablet Take  by mouth.      estradiol (ESTRACE) 0.1 MG/GM vaginal cream INSERT 2G INTO THE VAGINA DAILY 42.5 g 2    ferrous gluconate (FERGON) 324 MG tablet Take 1 tablet by mouth Daily With Breakfast. 90 tablet 2    gabapentin (NEURONTIN) 100 MG capsule TAKE ONE CAPSULE BY MOUTH EVERY EVENING 30 capsule 0    levothyroxine (SYNTHROID, LEVOTHROID) 112 MCG tablet TAKE ONE TABLET BY MOUTH DAILY 180 tablet 0    Lyllana 0.0375 MG/24HR patch Place 1 patch on the skin as directed by provider 2 (Two) Times a Week.      sertraline (Zoloft) 25 MG tablet Take 3.25 tablets by mouth Daily.       "Zinc Sulfate 220 (50 Zn) MG tablet Take  by mouth.       No facility-administered medications prior to visit.     No opioid medication identified on active medication list. I have reviewed chart for other potential  high risk medication/s and harmful drug interactions in the elderly.      Aspirin is on active medication list. Aspirin use is not indicated based on review of current medical condition/s. Risk of harm outweighs potential benefits. Patient instructed to discontinue this medication.  .      Patient Active Problem List   Diagnosis    Colon polyps    Depression    GERD (gastroesophageal reflux disease)    Hyperlipidemia    Hypertension    Right knee DJD    Thyroid disorder    Restless leg syndrome    Diastolic dysfunction    Bilateral leg edema    Bilateral carotid bruits    Dizziness    Mild CAD     Advance Care Planning Advance Directive is on file.  ACP discussion was held with the patient during this visit. Patient has an advance directive in EMR which is still valid.             Objective   Vitals:    06/05/25 1303   BP: 120/68   Pulse: 62   SpO2: 97%   Weight: 69.7 kg (153 lb 9.6 oz)   Height: 174 cm (68.5\")   PainSc: 0-No pain       Estimated body mass index is 23.01 kg/m² as calculated from the following:    Height as of this encounter: 174 cm (68.5\").    Weight as of this encounter: 69.7 kg (153 lb 9.6 oz).    BMI is within normal parameters. No other follow-up for BMI required.           Does the patient have evidence of cognitive impairment? No                                                                                                Health  Risk Assessment    Smoking Status:  Social History     Tobacco Use   Smoking Status Never    Passive exposure: Never   Smokeless Tobacco Never   Tobacco Comments    Caffeine: 2-3 cups daily     Alcohol Consumption:  Social History     Substance and Sexual Activity   Alcohol Use Yes    Alcohol/week: 3.0 standard drinks of alcohol    Types: 3 Glasses of " wine per week    Comment: I drink “red” wine with dinner sometimes.       Fall Risk Screen  HERIBERTO Fall Risk Assessment was completed, and patient is at LOW risk for falls.Assessment completed on:2025    Depression Screening   Little interest or pleasure in doing things? Not at all   Feeling down, depressed, or hopeless? Not at all   PHQ-2 Total Score 0      Health Habits and Functional and Cognitive Screenin/5/2025     1:03 PM   Functional & Cognitive Status   Do you have difficulty preparing food and eating? No   Do you have difficulty bathing yourself, getting dressed or grooming yourself? No   Do you have difficulty using the toilet? No   Do you have difficulty moving around from place to place? No   Do you have trouble with steps or getting out of a bed or a chair? No   Do you need help using the phone?  No   Are you deaf or do you have serious difficulty hearing?  No   Do you need help to go to places out of walking distance? No   Do you need help shopping? No   Do you need help preparing meals?  No   Do you need help with housework?  No   Do you need help with laundry? No   Do you need help taking your medications? No   Do you need help managing money? No   Do you ever drive or ride in a car without wearing a seat belt? No   Have you felt unusual stress, anger or loneliness in the last month? No   Who do you live with? Child   If you need help, do you have trouble finding someone available to you? No   Have you been bothered in the last four weeks by sexual problems? No   Do you have difficulty concentrating, remembering or making decisions? No           Age-appropriate Screening Schedule:  Refer to the list below for future screening recommendations based on patient's age, sex and/or medical conditions. Orders for these recommended tests are listed in the plan section. The patient has been provided with a written plan.    Health Maintenance List  Health Maintenance   Topic Date Due    TDAP/TD  VACCINES (1 - Tdap) Never done    RSV Vaccine - Adults (1 - 1-dose 75+ series) Never done    COVID-19 Vaccine (5 - 2024-25 season) 09/01/2024    ANNUAL WELLNESS VISIT  10/04/2024    LIPID PANEL  05/23/2025    DXA SCAN  07/20/2025    INFLUENZA VACCINE  07/01/2025    Pneumococcal Vaccine 50+  Completed    ZOSTER VACCINE  Completed                                                                                                                                                CMS Preventative Services Quick Reference  Risk Factors Identified During Encounter  Immunizations Discussed/Encouraged: Tdap and RSV (Respiratory Syncytial Virus)    The above risks/problems have been discussed with the patient.  Pertinent information has been shared with the patient in the After Visit Summary.  An After Visit Summary and PPPS were made available to the patient.    Follow Up:   Next Medicare Wellness visit to be scheduled in 1 year.         Additional E&M Note during same encounter follows:  Patient has additional, significant, and separately identifiable condition(s)/problem(s) that require work above and beyond the Medicare Wellness Visit     Chief Complaint  Medicare Wellness-subsequent    Subjective   HPI  Sheela is also being seen today for additional medical problem/s.          History of Present Illness  The patient came in for evaluation of weight loss, feeling lightheaded, hypothyroidism, restless legs syndrome, lymphedema, and general health maintenance.    She has lost 30 pounds, which she thinks might be due to the stress of caring for her grandson. She tries to eat healthy, mostly vegetarian with some seafood or steak, and avoids fast food. Her weight was 183 pounds about 1.5 years ago and is now 153 pounds. She has a good appetite and hasn't had any stomach issues.    She feels lightheaded off and on. She hasn't fallen recently but is worried about falling. Her cardiologist advised her to drink 64 ounces of water daily,  "sometimes with Pedialyte for electrolytes. She's been on Zoloft since 1999 and is currently taking 75 mg plus a quarter tablet. She wants advice on tapering off Zoloft and consulted with psychiatry who thought she might have had a panic attack. She wants to stop Zoloft because she thinks it's causing her lightheadedness.    She has hypothyroidism and takes levothyroxine. Her thyroid was last checked 6 months ago. She tends to feel cold.    She feels her physical health is better compared to last year, her mental health is the same, and she hasn't been hospitalized in the past year. She has a living will and has declined the RSV vaccine, mammograms, and Pap smears due to her age. She prefers not to have biopsies, chemotherapy, or radiation therapy. She had a complete hysterectomy in 1999 and hasn't had a bladder infection for 6 years.    She is on medication for cholesterol.    Her restless legs syndrome is under control with gabapentin, which she takes about 1.5 hours before bedtime.    Gabapentin also helps with her lymphedema. She uses lotion on her legs and feet daily and wears stockings.              Objective   Vital Signs:  /68   Pulse 62   Ht 174 cm (68.5\")   Wt 69.7 kg (153 lb 9.6 oz)   SpO2 97%   BMI 23.01 kg/m²   Physical Exam  Constitutional:       Appearance: Normal appearance.   Cardiovascular:      Rate and Rhythm: Normal rate and regular rhythm.      Heart sounds: Normal heart sounds. No murmur heard.  Pulmonary:      Effort: Pulmonary effort is normal.      Breath sounds: Normal breath sounds.   Abdominal:      General: Abdomen is flat. There is no distension.      Palpations: Abdomen is soft.      Tenderness: There is no abdominal tenderness.   Skin:     General: Skin is warm and dry.   Neurological:      Mental Status: She is alert.   Psychiatric:         Mood and Affect: Mood normal.         Behavior: Behavior normal.         Thought Content: Thought content normal.         The " following data was reviewed by: Stefan Valencia MD on 06/05/2025:  Data reviewed : recent cardiology and sleep note  CMP          6/25/2024    10:03 12/4/2024    10:23 1/27/2025    12:00   CMP   Glucose 95  92  86    BUN 16  16  13    Creatinine 0.65  0.72  0.68    EGFR 89.1  84  87.6    Sodium 139  141  141    Potassium 4.1  4.2  4.2    Chloride 101  105  105    Calcium 9.5  9.2  9.2    BUN/Creatinine Ratio 24.6  22  19.1    Anion Gap   11.5      CBC w/diff          12/4/2024    10:23   CBC w/Diff   WBC 5.4    RBC 4.13    Hemoglobin 12.7    Hematocrit 38.3    MCV 93    MCH 30.8    MCHC 33.2    RDW 11.9    Platelets 247    Neutrophil Rel % 53    Lymphocyte Rel % 35    Monocyte Rel % 8    Eosinophil Rel % 3    Basophil Rel % 1        TSH          12/4/2024    10:23   TSH   TSH 1.750             Assessment and Plan      Medicare annual wellness visit, subsequent         Weight loss  - Significant weight loss of 30 pounds over the past 1.5 years  - Good appetite, no gastrointestinal symptoms  - Monitor weight and appetite, consider further evaluation if necessary         Dizziness  -Dizziness comes and goes. Advised to drink more water by cardiology. Trying to wean of zoloft because there is concern this is causing dizziness. Declines PT.  - No recent falls, no significant changes in blood pressure  - Gradually taper off Zoloft, reducing dosage by half a tablet every month, communicate tapering schedule to psychiatrist  - Physical therapy recommended for dizziness and balance         Anxiety  Taking taking 3.25 of 25 mg Zoloft and wanting to wean. I think it is too hard for her to cut into quarters. Decrease by half tab each month.   Orders:    sertraline (Zoloft) 25 MG tablet; Take 3 tablets by mouth Daily for 28 days, THEN 2.5 tablets Daily for 28 days, THEN 2 tablets Daily for 28 days, THEN 1.25 tablets Daily for 28 days, THEN 1 tablet Daily for 28 days, THEN 0.5 tablets Daily for 28 days.    Hypothyroidism, unspecified  type  TSH in goal range. Always cold.     Orders:    CBC & Differential    TSH Rfx On Abnormal To Free T4    Restless leg syndrome  Taking gabapentin which helps  UDS up to date         Lymphedema  Wears stockings       Mixed hyperlipidemia  On statin  Orders:    Comprehensive Metabolic Panel    Lipid Panel            Follow Up   No follow-ups on file.  Patient was given instructions and counseling regarding her condition or for health maintenance advice. Please see specific information pulled into the AVS if appropriate.

## 2025-06-05 NOTE — Clinical Note
Taking taking 3.25 of 25 mg Zoloft and wanting to wean. I think it is too hard for her to cut into quarters. Decrease by half tab each month.

## 2025-06-05 NOTE — ASSESSMENT & PLAN NOTE
-Dizziness comes and goes. Advised to drink more water by cardiology. Trying to wean of zoloft because there is concern this is causing dizziness. Declines PT.  - No recent falls, no significant changes in blood pressure  - Gradually taper off Zoloft, reducing dosage by half a tablet every month, communicate tapering schedule to psychiatrist  - Physical therapy recommended for dizziness and balance

## 2025-06-06 ENCOUNTER — TELEPHONE (OUTPATIENT)
Dept: SLEEP MEDICINE | Facility: HOSPITAL | Age: 80
End: 2025-06-06
Payer: MEDICARE

## 2025-06-06 LAB
ALBUMIN SERPL-MCNC: 4.3 G/DL (ref 3.5–5.2)
ALBUMIN/GLOB SERPL: 2 G/DL
ALP SERPL-CCNC: 98 U/L (ref 39–117)
ALT SERPL-CCNC: 19 U/L (ref 1–33)
AST SERPL-CCNC: 22 U/L (ref 1–32)
BASOPHILS # BLD AUTO: 0.04 10*3/MM3 (ref 0–0.2)
BASOPHILS NFR BLD AUTO: 0.7 % (ref 0–1.5)
BILIRUB SERPL-MCNC: 0.6 MG/DL (ref 0–1.2)
BUN SERPL-MCNC: 12 MG/DL (ref 8–23)
BUN/CREAT SERPL: 17.9 (ref 7–25)
CALCIUM SERPL-MCNC: 9.6 MG/DL (ref 8.6–10.5)
CHLORIDE SERPL-SCNC: 104 MMOL/L (ref 98–107)
CHOLEST SERPL-MCNC: 181 MG/DL (ref 0–200)
CO2 SERPL-SCNC: 26.8 MMOL/L (ref 22–29)
CREAT SERPL-MCNC: 0.67 MG/DL (ref 0.57–1)
EGFRCR SERPLBLD CKD-EPI 2021: 87.9 ML/MIN/1.73
EOSINOPHIL # BLD AUTO: 0.13 10*3/MM3 (ref 0–0.4)
EOSINOPHIL NFR BLD AUTO: 2.3 % (ref 0.3–6.2)
ERYTHROCYTE [DISTWIDTH] IN BLOOD BY AUTOMATED COUNT: 12.9 % (ref 12.3–15.4)
GLOBULIN SER CALC-MCNC: 2.1 GM/DL
GLUCOSE SERPL-MCNC: 93 MG/DL (ref 65–99)
HCT VFR BLD AUTO: 39.8 % (ref 34–46.6)
HDLC SERPL-MCNC: 83 MG/DL (ref 40–60)
HGB BLD-MCNC: 12.9 G/DL (ref 12–15.9)
IMM GRANULOCYTES # BLD AUTO: 0.01 10*3/MM3 (ref 0–0.05)
IMM GRANULOCYTES NFR BLD AUTO: 0.2 % (ref 0–0.5)
LDLC SERPL CALC-MCNC: 79 MG/DL (ref 0–100)
LYMPHOCYTES # BLD AUTO: 1.7 10*3/MM3 (ref 0.7–3.1)
LYMPHOCYTES NFR BLD AUTO: 29.5 % (ref 19.6–45.3)
MCH RBC QN AUTO: 30.7 PG (ref 26.6–33)
MCHC RBC AUTO-ENTMCNC: 32.4 G/DL (ref 31.5–35.7)
MCV RBC AUTO: 94.8 FL (ref 79–97)
MONOCYTES # BLD AUTO: 0.43 10*3/MM3 (ref 0.1–0.9)
MONOCYTES NFR BLD AUTO: 7.5 % (ref 5–12)
NEUTROPHILS # BLD AUTO: 3.46 10*3/MM3 (ref 1.7–7)
NEUTROPHILS NFR BLD AUTO: 59.8 % (ref 42.7–76)
NRBC BLD AUTO-RTO: 0 /100 WBC (ref 0–0.2)
PLATELET # BLD AUTO: 258 10*3/MM3 (ref 140–450)
POTASSIUM SERPL-SCNC: 4.5 MMOL/L (ref 3.5–5.2)
PROT SERPL-MCNC: 6.4 G/DL (ref 6–8.5)
RBC # BLD AUTO: 4.2 10*6/MM3 (ref 3.77–5.28)
SODIUM SERPL-SCNC: 141 MMOL/L (ref 136–145)
TRIGL SERPL-MCNC: 108 MG/DL (ref 0–150)
TSH SERPL DL<=0.005 MIU/L-ACNC: 1.75 UIU/ML (ref 0.27–4.2)
VLDLC SERPL CALC-MCNC: 19 MG/DL (ref 5–40)
WBC # BLD AUTO: 5.77 10*3/MM3 (ref 3.4–10.8)

## 2025-06-18 ENCOUNTER — TELEPHONE (OUTPATIENT)
Dept: SLEEP MEDICINE | Facility: HOSPITAL | Age: 80
End: 2025-06-18
Payer: MEDICARE

## 2025-06-18 DIAGNOSIS — G25.81 RESTLESS LEG SYNDROME: ICD-10-CM

## 2025-06-18 RX ORDER — GABAPENTIN 100 MG/1
100 CAPSULE ORAL EVERY EVENING
Qty: 30 CAPSULE | Refills: 0 | Status: SHIPPED | OUTPATIENT
Start: 2025-06-18

## 2025-07-01 ENCOUNTER — HOSPITAL ENCOUNTER (OUTPATIENT)
Dept: SLEEP MEDICINE | Facility: HOSPITAL | Age: 80
Discharge: HOME | End: 2025-07-01
Admitting: FAMILY MEDICINE
Payer: MEDICARE

## 2025-07-01 PROCEDURE — G0399 HOME SLEEP TEST/TYPE 3 PORTA: HCPCS

## 2025-07-10 ENCOUNTER — OFFICE VISIT (OUTPATIENT)
Dept: SLEEP MEDICINE | Facility: HOSPITAL | Age: 80
End: 2025-07-10
Payer: MEDICARE

## 2025-07-10 VITALS — OXYGEN SATURATION: 95 % | WEIGHT: 150 LBS | HEART RATE: 57 BPM | BODY MASS INDEX: 22.22 KG/M2 | HEIGHT: 69 IN

## 2025-07-10 DIAGNOSIS — G47.33 OSA (OBSTRUCTIVE SLEEP APNEA): Primary | ICD-10-CM

## 2025-07-10 PROCEDURE — 99214 OFFICE O/P EST MOD 30 MIN: CPT | Performed by: INTERNAL MEDICINE

## 2025-07-10 PROCEDURE — 1159F MED LIST DOCD IN RCRD: CPT | Performed by: INTERNAL MEDICINE

## 2025-07-10 PROCEDURE — 1160F RVW MEDS BY RX/DR IN RCRD: CPT | Performed by: INTERNAL MEDICINE

## 2025-07-10 PROCEDURE — G0463 HOSPITAL OUTPT CLINIC VISIT: HCPCS

## 2025-07-10 NOTE — PROGRESS NOTES
"[facility]  Follow Up Sleep Disorders Center Note     Chief Complaint:  OMAYRA     Primary Care Physician: Stefan Valencia MD    Interval History:   The patient is a 80 y.o. female who was last seen 5/13/2025 and that note was reviewed.  The patient reportedly lost 30 pounds since her last sleep study.  She had stopped using her CPAP.  Repeat WatchPAT home sleep study performed 7/1/2025.  Mild persistent obstructive sleep apnea with AHI 6.2 events per hour noted.  No sleep-related hypoxia noted.  The patient snored 50.6% of total monitoring time.  The patient is here today for follow-up.    She still is not using her auto CPAP.  She states she is unchanged.  She goes to bed at 10:30 PM gets out of bed at 8:30 AM.  She will use the restroom during the nighttime.    Review of Systems:    A complete review of systems was done and all were negative with the exception of some anxiety and depression    Social History:    Social History     Socioeconomic History    Marital status:     Number of children: 2   Tobacco Use    Smoking status: Never     Passive exposure: Never    Smokeless tobacco: Never    Tobacco comments:     Caffeine: 2-3 cups daily   Vaping Use    Vaping status: Never Used   Substance and Sexual Activity    Alcohol use: Yes     Alcohol/week: 3.0 standard drinks of alcohol     Types: 3 Glasses of wine per week     Comment: I drink “red” wine with dinner sometimes.    Drug use: Never    Sexual activity: Not Currently     Partners: Male     Birth control/protection: None, Tubal ligation, Hysterectomy       Allergies:  Codeine and Latex     Medication Review:  Reviewed.      Vital Signs:    Vitals:    07/10/25 1320   Pulse: 57   SpO2: 95%   Weight: 68 kg (150 lb)   Height: 174 cm (68.5\")     Body mass index is 22.48 kg/m².    Physical Exam:    Constitutional:  Well developed 80 y.o. female that appears in no apparent distress.  Awake & oriented times 3.  Normal mood with normal recent and " remote memory and normal judgement.  Eyes:  Conjunctivae normal.  Oropharynx: Previously, moist mucous membranes without exudate and a decreased posterior pharyngeal opening.  No dentures.    Self-administered [place] Sleepiness Scale test results: 2  0-5 Lower normal daytime sleepiness  6-10 Higher normal daytime sleepiness  11-12 Mild, 13-15 Moderate, & 16-24 Severe excessive daytime sleepiness     Impression:   Mild obstructive sleep apnea by home sleep study 7/13/2016, weight 178 pounds, previously adequately treated with DreamStation 2 auto CPAP.  The patient has not used her DreamStation 2 auto CPAP since 11/27/2024.  Mild obstructive sleep apnea with AHI 6.2 events per hour by WatchPAT home sleep study 7/1/2025, weight 153 pounds.  The patient denies complaints of hypersomnolence.    Plan:  Good sleep hygiene measures should be maintained.  BMI is within normal parameters. No other follow-up for BMI required.    I reviewed all with the patient.  I reviewed both home sleep studies with her.  Persistent mild obstructive sleep apnea present.  Additionally, significant snoring persists.  I reviewed that auto CPAP therapy would be appropriate to continue.  However, alternatively, the patient may also consider mandibular advancement device.  This was reviewed with her also.  After answering her questions, the patient wishes to proceed with mandibular advancement device evaluation.  If this does not occur, the patient should restart auto CPAP.  If the patient does elect to proceed with mandibular advancement device, follow-up home sleep study should be performed to prove the mandibular advancement device efficacy.  Again, I answered all of her question.  The patient will follow-up with dental sleep medicine.      The patient will call the Sleep Disorder Center for any problems and will follow up for obstructive sleep apnea care as directed by dental sleep medicine       Kaleb Garcia MD  Sleep  Medicine  07/10/25  13:34 EDT

## 2025-07-12 PROBLEM — G47.33 OSA (OBSTRUCTIVE SLEEP APNEA): Status: ACTIVE | Noted: 2025-07-01

## 2025-07-12 PROBLEM — G47.33 OSA (OBSTRUCTIVE SLEEP APNEA): Status: RESOLVED | Noted: 2025-07-12 | Resolved: 2025-07-12

## 2025-07-12 PROBLEM — G47.33 OSA (OBSTRUCTIVE SLEEP APNEA): Status: ACTIVE | Noted: 2025-07-12

## 2025-07-16 DIAGNOSIS — G25.81 RESTLESS LEG SYNDROME: ICD-10-CM

## 2025-07-16 RX ORDER — GABAPENTIN 100 MG/1
100 CAPSULE ORAL EVERY EVENING
Qty: 90 CAPSULE | Refills: 2 | Status: SHIPPED | OUTPATIENT
Start: 2025-07-16

## 2025-08-20 RX ORDER — ATORVASTATIN CALCIUM 40 MG/1
40 TABLET, FILM COATED ORAL EVERY EVENING
Qty: 90 TABLET | Refills: 0 | Status: SHIPPED | OUTPATIENT
Start: 2025-08-20

## 2025-08-20 RX ORDER — FERROUS GLUCONATE 324(38)MG
1 TABLET ORAL
Qty: 90 TABLET | Refills: 2 | Status: SHIPPED | OUTPATIENT
Start: 2025-08-20

## 2025-08-22 ENCOUNTER — OFFICE VISIT (OUTPATIENT)
Dept: INTERNAL MEDICINE | Facility: CLINIC | Age: 80
End: 2025-08-22
Payer: MEDICARE

## 2025-08-22 VITALS
WEIGHT: 150.6 LBS | BODY MASS INDEX: 22.31 KG/M2 | DIASTOLIC BLOOD PRESSURE: 72 MMHG | SYSTOLIC BLOOD PRESSURE: 122 MMHG | HEART RATE: 68 BPM | OXYGEN SATURATION: 96 % | HEIGHT: 69 IN

## 2025-08-22 DIAGNOSIS — N30.01 ACUTE CYSTITIS WITH HEMATURIA: Primary | ICD-10-CM

## 2025-08-22 LAB
BILIRUB BLD-MCNC: NEGATIVE MG/DL
CLARITY, POC: ABNORMAL
COLOR UR: YELLOW
EXPIRATION DATE: ABNORMAL
GLUCOSE UR STRIP-MCNC: NEGATIVE MG/DL
KETONES UR QL: NEGATIVE
LEUKOCYTE EST, POC: ABNORMAL
Lab: ABNORMAL
NITRITE UR-MCNC: POSITIVE MG/ML
PH UR: 8.5 [PH] (ref 5–8)
PROT UR STRIP-MCNC: ABNORMAL MG/DL
RBC # UR STRIP: ABNORMAL /UL
SP GR UR: 1.03 (ref 1–1.03)
UROBILINOGEN UR QL: NORMAL

## 2025-08-22 PROCEDURE — 81003 URINALYSIS AUTO W/O SCOPE: CPT | Performed by: STUDENT IN AN ORGANIZED HEALTH CARE EDUCATION/TRAINING PROGRAM

## 2025-08-22 PROCEDURE — 99213 OFFICE O/P EST LOW 20 MIN: CPT | Performed by: STUDENT IN AN ORGANIZED HEALTH CARE EDUCATION/TRAINING PROGRAM

## 2025-08-22 PROCEDURE — 3078F DIAST BP <80 MM HG: CPT | Performed by: STUDENT IN AN ORGANIZED HEALTH CARE EDUCATION/TRAINING PROGRAM

## 2025-08-22 PROCEDURE — 3074F SYST BP LT 130 MM HG: CPT | Performed by: STUDENT IN AN ORGANIZED HEALTH CARE EDUCATION/TRAINING PROGRAM

## 2025-08-22 PROCEDURE — 1126F AMNT PAIN NOTED NONE PRSNT: CPT | Performed by: STUDENT IN AN ORGANIZED HEALTH CARE EDUCATION/TRAINING PROGRAM

## 2025-08-22 RX ORDER — PHENAZOPYRIDINE HYDROCHLORIDE 95 MG/1
95 TABLET ORAL 3 TIMES DAILY PRN
Qty: 6 TABLET | Refills: 0 | Status: SHIPPED | OUTPATIENT
Start: 2025-08-22 | End: 2025-08-24

## 2025-08-22 RX ORDER — NITROFURANTOIN 25; 75 MG/1; MG/1
100 CAPSULE ORAL 2 TIMES DAILY
Qty: 10 CAPSULE | Refills: 0 | Status: SHIPPED | OUTPATIENT
Start: 2025-08-22 | End: 2025-08-27

## 2025-08-27 LAB
APPEARANCE UR: ABNORMAL
BACTERIA #/AREA URNS HPF: ABNORMAL /[HPF]
BACTERIA UR CULT: ABNORMAL
BACTERIA UR CULT: ABNORMAL
BILIRUB UR QL STRIP: NEGATIVE
CASTS URNS QL MICRO: ABNORMAL /LPF
COLOR UR: YELLOW
EPI CELLS #/AREA URNS HPF: ABNORMAL /HPF (ref 0–10)
GLUCOSE UR QL STRIP: NEGATIVE
HGB UR QL STRIP: ABNORMAL
KETONES UR QL STRIP: NEGATIVE
LEUKOCYTE ESTERASE UR QL STRIP: ABNORMAL
MICRO URNS: ABNORMAL
NITRITE UR QL STRIP: POSITIVE
OTHER ANTIBIOTIC SUSC ISLT: ABNORMAL
PH UR STRIP: 8 [PH] (ref 5–7.5)
PROT UR QL STRIP: ABNORMAL
RBC #/AREA URNS HPF: ABNORMAL /HPF (ref 0–2)
SP GR UR STRIP: 1.01 (ref 1–1.03)
UROBILINOGEN UR STRIP-MCNC: 0.2 MG/DL (ref 0.2–1)
WBC #/AREA URNS HPF: >30 /HPF (ref 0–5)